# Patient Record
Sex: MALE | Race: BLACK OR AFRICAN AMERICAN | NOT HISPANIC OR LATINO | Employment: OTHER | URBAN - METROPOLITAN AREA
[De-identification: names, ages, dates, MRNs, and addresses within clinical notes are randomized per-mention and may not be internally consistent; named-entity substitution may affect disease eponyms.]

---

## 2019-06-13 ENCOUNTER — HOSPITAL ENCOUNTER (INPATIENT)
Facility: HOSPITAL | Age: 54
LOS: 2 days | Discharge: HOME | DRG: 189 | End: 2019-06-15
Attending: EMERGENCY MEDICINE | Admitting: EMERGENCY MEDICINE
Payer: MEDICARE

## 2019-06-13 DIAGNOSIS — R50.9 FEBRILE ILLNESS, ACUTE: ICD-10-CM

## 2019-06-13 DIAGNOSIS — I50.9 CHF (CONGESTIVE HEART FAILURE): ICD-10-CM

## 2019-06-13 DIAGNOSIS — I50.9 HEART FAILURE: ICD-10-CM

## 2019-06-13 DIAGNOSIS — R65.10 SIRS (SYSTEMIC INFLAMMATORY RESPONSE SYNDROME): ICD-10-CM

## 2019-06-13 DIAGNOSIS — I50.9 CONGESTIVE HEART FAILURE, UNSPECIFIED HF CHRONICITY, UNSPECIFIED HEART FAILURE TYPE: ICD-10-CM

## 2019-06-13 DIAGNOSIS — R06.00 DYSPNEA: ICD-10-CM

## 2019-06-13 DIAGNOSIS — R06.03 ACUTE RESPIRATORY DISTRESS: Primary | ICD-10-CM

## 2019-06-13 PROBLEM — N17.9 ARF (ACUTE RENAL FAILURE): Status: ACTIVE | Noted: 2019-06-13

## 2019-06-13 PROBLEM — F14.20 COCAINE USE DISORDER, SEVERE, DEPENDENCE: Status: ACTIVE | Noted: 2019-06-13

## 2019-06-13 PROBLEM — I50.23 ACUTE ON CHRONIC SYSTOLIC HEART FAILURE: Status: ACTIVE | Noted: 2019-06-13

## 2019-06-13 PROBLEM — A41.9 SEPSIS: Status: ACTIVE | Noted: 2019-06-13

## 2019-06-13 PROBLEM — N18.30 ACUTE RENAL FAILURE SUPERIMPOSED ON STAGE 3 CHRONIC KIDNEY DISEASE: Status: ACTIVE | Noted: 2019-06-13

## 2019-06-13 PROBLEM — I42.8 NICM (NONISCHEMIC CARDIOMYOPATHY): Status: ACTIVE | Noted: 2019-06-13

## 2019-06-13 PROBLEM — J96.01 ACUTE RESPIRATORY FAILURE WITH HYPOXEMIA: Status: ACTIVE | Noted: 2019-06-13

## 2019-06-13 LAB
ALBUMIN SERPL BCP-MCNC: 4 G/DL (ref 3.5–5.2)
ALLENS TEST: ABNORMAL
ALLENS TEST: ABNORMAL
ALP SERPL-CCNC: 64 U/L (ref 55–135)
ALT SERPL W/O P-5'-P-CCNC: 19 U/L (ref 10–44)
AMORPH CRY URNS QL MICRO: ABNORMAL
AMPHET+METHAMPHET UR QL: NEGATIVE
ANION GAP SERPL CALC-SCNC: 15 MMOL/L (ref 8–16)
AORTIC ROOT ANNULUS: 2.76 CM
AORTIC VALVE CUSP SEPERATION: 2.1 CM
APTT BLDCRRT: 30.5 SEC (ref 21–32)
ASCENDING AORTA: 2.69 CM
AST SERPL-CCNC: 36 U/L (ref 10–40)
AV INDEX (PROSTH): 0.89
AV MEAN GRADIENT: 2.43 MMHG
AV PEAK GRADIENT: 4.16 MMHG
AV VALVE AREA: 3.09 CM2
AV VELOCITY RATIO: 0.94
BACTERIA #/AREA URNS HPF: ABNORMAL /HPF
BARBITURATES UR QL SCN>200 NG/ML: NEGATIVE
BASOPHILS # BLD AUTO: 0.03 K/UL (ref 0–0.2)
BASOPHILS NFR BLD: 0.4 % (ref 0–1.9)
BENZODIAZ UR QL SCN>200 NG/ML: NEGATIVE
BILIRUB SERPL-MCNC: 1.3 MG/DL (ref 0.1–1)
BILIRUB UR QL STRIP: ABNORMAL
BNP SERPL-MCNC: >4900 PG/ML (ref 0–99)
BSA FOR ECHO PROCEDURE: 2.11 M2
BUN SERPL-MCNC: 34 MG/DL (ref 6–20)
BZE UR QL SCN: NORMAL
CALCIUM SERPL-MCNC: 9.9 MG/DL (ref 8.7–10.5)
CANNABINOIDS UR QL SCN: NORMAL
CHLORIDE SERPL-SCNC: 96 MMOL/L (ref 95–110)
CLARITY UR: ABNORMAL
CO2 SERPL-SCNC: 25 MMOL/L (ref 23–29)
COLOR UR: ABNORMAL
CREAT SERPL-MCNC: 2.5 MG/DL (ref 0.5–1.4)
CREAT UR-MCNC: 269.8 MG/DL (ref 23–375)
CV ECHO LV RWT: 0.37 CM
D DIMER PPP IA.FEU-MCNC: 2.12 MG/L FEU
DELSYS: ABNORMAL
DELSYS: ABNORMAL
DIFFERENTIAL METHOD: ABNORMAL
DOP CALC AO PEAK VEL: 1.02 M/S
DOP CALC AO VTI: 16.16 CM
DOP CALC LVOT AREA: 3.46 CM2
DOP CALC LVOT DIAMETER: 2.1 CM
DOP CALC LVOT PEAK VEL: 0.95 M/S
DOP CALC LVOT STROKE VOLUME: 49.89 CM3
DOP CALCLVOT PEAK VEL VTI: 14.41 CM
E WAVE DECELERATION TIME: 145.06 MSEC
E/A RATIO: 1.68
ECHO LV POSTERIOR WALL: 1.12 CM (ref 0.6–1.1)
EOSINOPHIL # BLD AUTO: 0 K/UL (ref 0–0.5)
EOSINOPHIL NFR BLD: 0 % (ref 0–8)
EP: 8
ERYTHROCYTE [DISTWIDTH] IN BLOOD BY AUTOMATED COUNT: 15.6 % (ref 11.5–14.5)
ERYTHROCYTE [SEDIMENTATION RATE] IN BLOOD BY WESTERGREN METHOD: 16 MM/H
EST. GFR  (AFRICAN AMERICAN): 33 ML/MIN/1.73 M^2
EST. GFR  (NON AFRICAN AMERICAN): 28 ML/MIN/1.73 M^2
FIO2: 100
FRACTIONAL SHORTENING: 6 % (ref 28–44)
GLUCOSE SERPL-MCNC: 143 MG/DL (ref 70–110)
GLUCOSE UR QL STRIP: NEGATIVE
HCO3 UR-SCNC: 27.6 MMOL/L (ref 24–28)
HCO3 UR-SCNC: 28.8 MMOL/L (ref 24–28)
HCT VFR BLD AUTO: 46.7 % (ref 40–54)
HGB BLD-MCNC: 15.3 G/DL (ref 14–18)
HGB UR QL STRIP: ABNORMAL
HYALINE CASTS #/AREA URNS LPF: 10 /LPF
INR PPP: 1.2 (ref 0.8–1.2)
INTERVENTRICULAR SEPTUM: 1.19 CM (ref 0.6–1.1)
IP: 12
KETONES UR QL STRIP: ABNORMAL
LA MAJOR: 5.99 CM
LA MINOR: 7.44 CM
LA WIDTH: 3.62 CM
LACTATE SERPL-SCNC: 1.8 MMOL/L (ref 0.5–2.2)
LEFT ATRIUM SIZE: 4.37 CM
LEFT ATRIUM VOLUME INDEX: 42.8 ML/M2
LEFT ATRIUM VOLUME: 89.24 CM3
LEFT INTERNAL DIMENSION IN SYSTOLE: 5.64 CM (ref 2.1–4)
LEFT VENTRICLE DIASTOLIC VOLUME INDEX: 86.19 ML/M2
LEFT VENTRICLE DIASTOLIC VOLUME: 179.52 ML
LEFT VENTRICLE MASS INDEX: 142.8 G/M2
LEFT VENTRICLE SYSTOLIC VOLUME INDEX: 74.9 ML/M2
LEFT VENTRICLE SYSTOLIC VOLUME: 156.11 ML
LEFT VENTRICULAR INTERNAL DIMENSION IN DIASTOLE: 5.99 CM (ref 3.5–6)
LEFT VENTRICULAR MASS: 297.51 G
LEUKOCYTE ESTERASE UR QL STRIP: NEGATIVE
LYMPHOCYTES # BLD AUTO: 0.5 K/UL (ref 1–4.8)
LYMPHOCYTES NFR BLD: 6.9 % (ref 18–48)
MAGNESIUM SERPL-MCNC: 1.9 MG/DL (ref 1.6–2.6)
MCH RBC QN AUTO: 28.5 PG (ref 27–31)
MCHC RBC AUTO-ENTMCNC: 32.8 G/DL (ref 32–36)
MCV RBC AUTO: 87 FL (ref 82–98)
METHADONE UR QL SCN>300 NG/ML: NEGATIVE
MICROSCOPIC COMMENT: ABNORMAL
MIN VOL: 17
MODE: ABNORMAL
MODE: ABNORMAL
MONOCYTES # BLD AUTO: 1 K/UL (ref 0.3–1)
MONOCYTES NFR BLD: 12.6 % (ref 4–15)
MV PEAK A VEL: 0.56 M/S
MV PEAK E VEL: 0.94 M/S
NEUTROPHILS # BLD AUTO: 6 K/UL (ref 1.8–7.7)
NEUTROPHILS NFR BLD: 80.1 % (ref 38–73)
NITRITE UR QL STRIP: NEGATIVE
OPIATES UR QL SCN: NEGATIVE
PCO2 BLDA: 45 MMHG (ref 35–45)
PCO2 BLDA: 45.3 MMHG (ref 35–45)
PCP UR QL SCN>25 NG/ML: NEGATIVE
PH SMN: 7.39 [PH] (ref 7.35–7.45)
PH SMN: 7.42 [PH] (ref 7.35–7.45)
PH UR STRIP: 6 [PH] (ref 5–8)
PHOSPHATE SERPL-MCNC: 4.2 MG/DL (ref 2.7–4.5)
PISA TR MAX VEL: 2.44 M/S
PLATELET # BLD AUTO: 219 K/UL (ref 150–350)
PMV BLD AUTO: 9.9 FL (ref 9.2–12.9)
PO2 BLDA: 113 MMHG (ref 80–100)
PO2 BLDA: 532 MMHG (ref 80–100)
POC BE: 2 MMOL/L
POC BE: 4 MMOL/L
POC SATURATED O2: 100 % (ref 95–100)
POC SATURATED O2: 98 % (ref 95–100)
POC TCO2: 29 MMOL/L (ref 23–27)
POC TCO2: 30 MMOL/L (ref 23–27)
POTASSIUM SERPL-SCNC: 4.6 MMOL/L (ref 3.5–5.1)
PROCALCITONIN SERPL IA-MCNC: 0.71 NG/ML
PROT SERPL-MCNC: 8 G/DL (ref 6–8.4)
PROT UR QL STRIP: ABNORMAL
PROTHROMBIN TIME: 12.7 SEC (ref 9–12.5)
PV PEAK VELOCITY: 0.82 CM/S
RA MAJOR: 5.42 CM
RA PRESSURE: 8 MMHG
RA WIDTH: 2.99 CM
RBC # BLD AUTO: 5.37 M/UL (ref 4.6–6.2)
RBC #/AREA URNS HPF: 0 /HPF (ref 0–4)
RIGHT VENTRICULAR END-DIASTOLIC DIMENSION: 4.43 CM
RV TISSUE DOPPLER FREE WALL SYSTOLIC VELOCITY 1 (APICAL 4 CHAMBER VIEW): 7.73 M/S
SAMPLE: ABNORMAL
SAMPLE: ABNORMAL
SINUS: 3.18 CM
SITE: ABNORMAL
SITE: ABNORMAL
SODIUM SERPL-SCNC: 136 MMOL/L (ref 136–145)
SP GR UR STRIP: >1.03 (ref 1–1.03)
SP02: 100
SPONT RATE: 50
STJ: 2.61 CM
TOXICOLOGY INFORMATION: NORMAL
TR MAX PG: 23.81 MMHG
TRICUSPID ANNULAR PLANE SYSTOLIC EXCURSION: 1.65 CM
TROPONIN I SERPL DL<=0.01 NG/ML-MCNC: 0.17 NG/ML (ref 0–0.03)
TV REST PULMONARY ARTERY PRESSURE: 32 MMHG
URN SPEC COLLECT METH UR: ABNORMAL
UROBILINOGEN UR STRIP-ACNC: 1 EU/DL
WBC # BLD AUTO: 7.53 K/UL (ref 3.9–12.7)
WBC #/AREA URNS HPF: 3 /HPF (ref 0–5)

## 2019-06-13 PROCEDURE — 36600 WITHDRAWAL OF ARTERIAL BLOOD: CPT

## 2019-06-13 PROCEDURE — 93010 ELECTROCARDIOGRAM REPORT: CPT | Mod: ,,, | Performed by: INTERNAL MEDICINE

## 2019-06-13 PROCEDURE — C9113 INJ PANTOPRAZOLE SODIUM, VIA: HCPCS | Performed by: EMERGENCY MEDICINE

## 2019-06-13 PROCEDURE — 81000 URINALYSIS NONAUTO W/SCOPE: CPT

## 2019-06-13 PROCEDURE — 80053 COMPREHEN METABOLIC PANEL: CPT

## 2019-06-13 PROCEDURE — 99291 PR CRITICAL CARE, E/M 30-74 MINUTES: ICD-10-PCS | Mod: ,,, | Performed by: INTERNAL MEDICINE

## 2019-06-13 PROCEDURE — 20000000 HC ICU ROOM

## 2019-06-13 PROCEDURE — 85025 COMPLETE CBC W/AUTO DIFF WBC: CPT

## 2019-06-13 PROCEDURE — 83880 ASSAY OF NATRIURETIC PEPTIDE: CPT

## 2019-06-13 PROCEDURE — 85379 FIBRIN DEGRADATION QUANT: CPT

## 2019-06-13 PROCEDURE — 87070 CULTURE OTHR SPECIMN AEROBIC: CPT

## 2019-06-13 PROCEDURE — 99291 CRITICAL CARE FIRST HOUR: CPT | Mod: ,,, | Performed by: INTERNAL MEDICINE

## 2019-06-13 PROCEDURE — 99900035 HC TECH TIME PER 15 MIN (STAT)

## 2019-06-13 PROCEDURE — 82803 BLOOD GASES ANY COMBINATION: CPT

## 2019-06-13 PROCEDURE — 80307 DRUG TEST PRSMV CHEM ANLYZR: CPT

## 2019-06-13 PROCEDURE — 25000003 PHARM REV CODE 250: Performed by: EMERGENCY MEDICINE

## 2019-06-13 PROCEDURE — 85730 THROMBOPLASTIN TIME PARTIAL: CPT

## 2019-06-13 PROCEDURE — 96368 THER/DIAG CONCURRENT INF: CPT

## 2019-06-13 PROCEDURE — 63600175 PHARM REV CODE 636 W HCPCS: Performed by: EMERGENCY MEDICINE

## 2019-06-13 PROCEDURE — 96375 TX/PRO/DX INJ NEW DRUG ADDON: CPT

## 2019-06-13 PROCEDURE — 27000190 HC CPAP FULL FACE MASK W/VALVE

## 2019-06-13 PROCEDURE — 99291 CRITICAL CARE FIRST HOUR: CPT | Mod: 25

## 2019-06-13 PROCEDURE — 27000221 HC OXYGEN, UP TO 24 HOURS

## 2019-06-13 PROCEDURE — 96365 THER/PROPH/DIAG IV INF INIT: CPT | Mod: 59

## 2019-06-13 PROCEDURE — 83735 ASSAY OF MAGNESIUM: CPT

## 2019-06-13 PROCEDURE — 84145 PROCALCITONIN (PCT): CPT

## 2019-06-13 PROCEDURE — 94660 CPAP INITIATION&MGMT: CPT

## 2019-06-13 PROCEDURE — 87205 SMEAR GRAM STAIN: CPT

## 2019-06-13 PROCEDURE — 93010 EKG 12-LEAD: ICD-10-PCS | Mod: ,,, | Performed by: INTERNAL MEDICINE

## 2019-06-13 PROCEDURE — 84484 ASSAY OF TROPONIN QUANT: CPT

## 2019-06-13 PROCEDURE — 63600175 PHARM REV CODE 636 W HCPCS: Performed by: INTERNAL MEDICINE

## 2019-06-13 PROCEDURE — 84100 ASSAY OF PHOSPHORUS: CPT

## 2019-06-13 PROCEDURE — 94761 N-INVAS EAR/PLS OXIMETRY MLT: CPT

## 2019-06-13 PROCEDURE — 85610 PROTHROMBIN TIME: CPT

## 2019-06-13 PROCEDURE — 83605 ASSAY OF LACTIC ACID: CPT

## 2019-06-13 PROCEDURE — 93005 ELECTROCARDIOGRAM TRACING: CPT

## 2019-06-13 PROCEDURE — 51702 INSERT TEMP BLADDER CATH: CPT

## 2019-06-13 PROCEDURE — 25000003 PHARM REV CODE 250: Performed by: INTERNAL MEDICINE

## 2019-06-13 PROCEDURE — 87086 URINE CULTURE/COLONY COUNT: CPT

## 2019-06-13 PROCEDURE — 87040 BLOOD CULTURE FOR BACTERIA: CPT

## 2019-06-13 RX ORDER — FUROSEMIDE 40 MG/1
TABLET ORAL 2 TIMES DAILY
Status: ON HOLD | COMMUNITY
End: 2019-08-22 | Stop reason: SDUPTHER

## 2019-06-13 RX ORDER — FUROSEMIDE 10 MG/ML
40 INJECTION INTRAMUSCULAR; INTRAVENOUS 2 TIMES DAILY
Status: DISCONTINUED | OUTPATIENT
Start: 2019-06-14 | End: 2019-06-15 | Stop reason: HOSPADM

## 2019-06-13 RX ORDER — ACETAMINOPHEN 325 MG/1
650 TABLET ORAL EVERY 8 HOURS PRN
Status: DISCONTINUED | OUTPATIENT
Start: 2019-06-13 | End: 2019-06-15 | Stop reason: HOSPADM

## 2019-06-13 RX ORDER — ASPIRIN 81 MG/1
81 TABLET ORAL DAILY
COMMUNITY

## 2019-06-13 RX ORDER — PANTOPRAZOLE SODIUM 40 MG/10ML
40 INJECTION, POWDER, LYOPHILIZED, FOR SOLUTION INTRAVENOUS DAILY
Status: DISCONTINUED | OUTPATIENT
Start: 2019-06-13 | End: 2019-06-14

## 2019-06-13 RX ORDER — OXYCODONE AND ACETAMINOPHEN 5; 325 MG/1; MG/1
1 TABLET ORAL EVERY 4 HOURS PRN
Status: DISCONTINUED | OUTPATIENT
Start: 2019-06-13 | End: 2019-06-14

## 2019-06-13 RX ORDER — FUROSEMIDE 10 MG/ML
40 INJECTION INTRAMUSCULAR; INTRAVENOUS
Status: DISCONTINUED | OUTPATIENT
Start: 2019-06-13 | End: 2019-06-13

## 2019-06-13 RX ORDER — ONDANSETRON 2 MG/ML
4 INJECTION INTRAMUSCULAR; INTRAVENOUS EVERY 8 HOURS PRN
Status: DISCONTINUED | OUTPATIENT
Start: 2019-06-13 | End: 2019-06-15 | Stop reason: HOSPADM

## 2019-06-13 RX ORDER — FUROSEMIDE 10 MG/ML
40 INJECTION INTRAMUSCULAR; INTRAVENOUS
Status: COMPLETED | OUTPATIENT
Start: 2019-06-13 | End: 2019-06-13

## 2019-06-13 RX ORDER — FUROSEMIDE 10 MG/ML
40 INJECTION INTRAMUSCULAR; INTRAVENOUS ONCE
Status: COMPLETED | OUTPATIENT
Start: 2019-06-13 | End: 2019-06-13

## 2019-06-13 RX ORDER — CARVEDILOL 3.12 MG/1
3.12 TABLET ORAL 2 TIMES DAILY WITH MEALS
Status: ON HOLD | COMMUNITY
End: 2019-06-15 | Stop reason: HOSPADM

## 2019-06-13 RX ORDER — SODIUM CHLORIDE 0.9 % (FLUSH) 0.9 %
10 SYRINGE (ML) INJECTION
Status: DISCONTINUED | OUTPATIENT
Start: 2019-06-13 | End: 2019-06-15 | Stop reason: HOSPADM

## 2019-06-13 RX ORDER — AZITHROMYCIN 500 MG/1
500 TABLET, FILM COATED ORAL DAILY
Status: ON HOLD | COMMUNITY
End: 2019-06-15 | Stop reason: HOSPADM

## 2019-06-13 RX ORDER — PRAVASTATIN SODIUM 40 MG/1
40 TABLET ORAL DAILY
COMMUNITY

## 2019-06-13 RX ORDER — HEPARIN SODIUM 5000 [USP'U]/ML
5000 INJECTION, SOLUTION INTRAVENOUS; SUBCUTANEOUS EVERY 12 HOURS
Status: DISCONTINUED | OUTPATIENT
Start: 2019-06-13 | End: 2019-06-15 | Stop reason: HOSPADM

## 2019-06-13 RX ORDER — ACETAMINOPHEN 10 MG/ML
1000 INJECTION, SOLUTION INTRAVENOUS
Status: COMPLETED | OUTPATIENT
Start: 2019-06-13 | End: 2019-06-13

## 2019-06-13 RX ADMIN — ACETAMINOPHEN 1000 MG: 10 INJECTION, SOLUTION INTRAVENOUS at 09:06

## 2019-06-13 RX ADMIN — PIPERACILLIN AND TAZOBACTAM 4.5 G: 4; .5 INJECTION, POWDER, LYOPHILIZED, FOR SOLUTION INTRAVENOUS; PARENTERAL at 09:06

## 2019-06-13 RX ADMIN — OXYCODONE HYDROCHLORIDE AND ACETAMINOPHEN 1 TABLET: 5; 325 TABLET ORAL at 10:06

## 2019-06-13 RX ADMIN — PANTOPRAZOLE SODIUM 40 MG: 40 INJECTION, POWDER, FOR SOLUTION INTRAVENOUS at 12:06

## 2019-06-13 RX ADMIN — FUROSEMIDE 40 MG: 10 INJECTION, SOLUTION INTRAVENOUS at 04:06

## 2019-06-13 RX ADMIN — FUROSEMIDE 40 MG: 10 INJECTION, SOLUTION INTRAMUSCULAR; INTRAVENOUS at 09:06

## 2019-06-13 RX ADMIN — SODIUM CHLORIDE 250 ML: 0.9 INJECTION, SOLUTION INTRAVENOUS at 10:06

## 2019-06-13 RX ADMIN — PIPERACILLIN AND TAZOBACTAM 4.5 G: 4; .5 INJECTION, POWDER, LYOPHILIZED, FOR SOLUTION INTRAVENOUS; PARENTERAL at 04:06

## 2019-06-13 RX ADMIN — VANCOMYCIN HYDROCHLORIDE 1250 MG: 1.25 INJECTION, POWDER, LYOPHILIZED, FOR SOLUTION INTRAVENOUS at 09:06

## 2019-06-13 RX ADMIN — HEPARIN SODIUM 5000 UNITS: 5000 INJECTION, SOLUTION INTRAVENOUS; SUBCUTANEOUS at 09:06

## 2019-06-13 NOTE — HPI
"53 y.o male who has CHF, CVA, HTN, Pulmonary HTN, HLD, CKD stage 3, Cardiomyopathy, and Substance abuse presents to the ED for an evaluation of acute onset, constant, severe shortness of breath that began prior to arrival.  Per EMS, patient was recently discharged form [facility] after being diagnosed with a right lung pneumonia.  EMS reports upon their arrival, the patient had a O2 sat 86% RA, a RR 60, and temp of 101.6F.  EMS reports administering 2 sl nitro and 1 inch nitro paste.  EMS reports placing the patient on a duoneb tx while en route to the ED.  Upon arrival to the ED, the patient states "the antibiotics are not working".  No other history could be obtained at this time.  History limited secondary to condition of patient.    History is limited by the patient's current condition.  He is on BiPAP, able answer questions yes or no, but otherwise appears to have decreased mental status.  He denies any angina, tells me his shortness of breath is improved since admission to the hospital.  Review of his records notes a recent hospitalization at [facility].  He has a severe nonischemic cardiomyopathy with heart catheterization noting normal coronaries in January this year.  Echocardiogram performed last month confirms persistent reduced ejection fraction.  He is currently wearing a LifeVest with plans eventually to place a defibrillator.  Note is made of significant fever on admission today, with recent diagnosis of pneumonia elsewhere.  "

## 2019-06-13 NOTE — ASSESSMENT & PLAN NOTE
Recent hospitalization with PNA and admitted with resp distress and fever  Mgmt per IM  Suggest pressor support with levophed prn and judicious IVF as he appears to have vol overload on CXR and elev BNP on background of severe NICM, EF 20%.

## 2019-06-13 NOTE — PROGRESS NOTES
Dr. Ward called with new orders received. Pt and family instructed on new orders and verb understanding.

## 2019-06-13 NOTE — HPI
"Mr Guevara is a 53 year old male with heart failure and cocaine use disorder who presented with progressive shortness of breath of one day in evolution. Patient stated he was just discharged from [facility] yesterday with abx for a "lung infection". Stated he started the antibiotic (unsure which one) but felt worse and therefore presenting to us. Patient strongly denies recent alcohol, cigarette or drug use. Denied chest pain. Further history limited due to dyspnea while speaking and drowsiness.     Upon arrival to the ED, patient was in severe respiratory distress and hypoxemia of 86% on room air. Had a life vest in place. Was also febrile to 103F. Placed on BiPAP and diuresed with one dose of furosemide IV. Patient felt better afterwards but remained on BiPAP. Cr of 2.5 without acid base disturbance. No leukocytosis, anemia or coagulopathy. Trop 0.1. BNP > 4,900, tox screen positive for cocaine and THC. Urine concentrated. CXR with possibly mild pulm edema but no clear consolidations. Records from [facility] waiting to be uploaded via care everywhere. Patient admitted to ICU for further management while on BiPAP.   "

## 2019-06-13 NOTE — PLAN OF CARE
Problem: Adult Inpatient Plan of Care  Goal: Plan of Care Review  Outcome: Ongoing (interventions implemented as appropriate)  Pt on BiPap 15/8 and 30% O2. Pt has been with increased RR all day 30's-40's.Pt remains NPO Pt has remained safe and free of injury today. Pt arrived with Lifevest, daughter instructed to take Lifevest home. Pt has remained afebrile today.

## 2019-06-13 NOTE — CONSULTS
"[facility]  Cardiology  Consult Note    Patient Name: Terry Guevara  MRN: [id number]  Admission Date: 6/13/2019  Hospital Length of Stay: 0 days  Code Status: No Order   Attending Provider: Josefina Hall MD;Am*   Consulting Provider: Adam Hubbard MD  Primary Care Physician: To Obtain Unable  Principal Problem:Sepsis    Patient information was obtained from patient and ER records.     Inpatient consult to Cardiology  Consult performed by: Adam Hubbard MD  Consult ordered by: Josefina Hall MD  Reason for consult: NICM/Sepsis/trop        Subjective:     Chief Complaint:  SPB/fever     HPI:   53 y.o male who has CHF, CVA, HTN, Pulmonary HTN, HLD, CKD stage 3, Cardiomyopathy, and Substance abuse presents to the ED for an evaluation of acute onset, constant, severe shortness of breath that began prior to arrival.  Per EMS, patient was recently discharged form [facility] after being diagnosed with a right lung pneumonia.  EMS reports upon their arrival, the patient had a O2 sat 86% RA, a RR 60, and temp of 101.6F.  EMS reports administering 2 sl nitro and 1 inch nitro paste.  EMS reports placing the patient on a duoneb tx while en route to the ED.  Upon arrival to the ED, the patient states "the antibiotics are not working".  No other history could be obtained at this time.  History limited secondary to condition of patient.    History is limited by the patient's current condition.  He is on BiPAP, able answer questions yes or no, but otherwise appears to have decreased mental status.  He denies any angina, tells me his shortness of breath is improved since admission to the hospital.  Review of his records notes a recent hospitalization at [facility].  He has a severe nonischemic cardiomyopathy with heart catheterization noting normal coronaries in January this year.  Echocardiogram performed last month confirms persistent reduced ejection fraction.  He is currently wearing a LifeVest with plans " eventually to place a defibrillator.  Note is made of significant fever on admission today, with recent diagnosis of pneumonia elsewhere.    Past Medical History:   Diagnosis Date    Cardiomyopathy     CHF (congestive heart failure)     CKD (chronic kidney disease), stage III     Cocaine abuse     Hyperlipidemia     Hypertension     Pneumonia     Proteinuria     Pulmonary hypertension     Renal disorder     Respiratory failure with hypoxia     Stroke        History reviewed. No pertinent surgical history.    Review of patient's allergies indicates:  No Known Allergies    No current facility-administered medications on file prior to encounter.      Current Outpatient Medications on File Prior to Encounter   Medication Sig    aspirin (ECOTRIN) 81 MG EC tablet Take 81 mg by mouth once daily.    azithromycin (ZITHROMAX) 500 MG tablet Take 500 mg by mouth once daily. X 3 days - first dose 6/12/11    carvedilol (COREG) 3.125 MG tablet Take 3.125 mg by mouth 2 (two) times daily with meals.    dextromethorphan 15 mg/5 mL syrup Take 10 mLs by mouth 4 (four) times daily as needed for Cough. As needed for cough for up to 10 days    furosemide (LASIX) 40 MG tablet Take by mouth 2 (two) times daily. 120 mg in AM and 80 mg in evening    pravastatin (PRAVACHOL) 40 MG tablet Take 40 mg by mouth once daily.    sacubitril-valsartan (ENTRESTO) 24-26 mg per tablet Take 1 tablet by mouth 2 (two) times daily.     Family History     None        Tobacco Use    Smoking status: Never Smoker    Smokeless tobacco: Never Used   Substance and Sexual Activity    Alcohol use: Yes    Drug use: Yes     Types: Cocaine    Sexual activity: Not on file     Review of Systems   Unable to perform ROS: acuity of condition     Objective:     Vital Signs (Most Recent):  Temp: (!) 102.7 °F (39.3 °C) (06/13/19 1004)  Pulse: 68 (06/13/19 1052)  Resp: (!) 37 (06/13/19 1052)  BP: (!) 84/55 (06/13/19 1052)  SpO2: 98 % (06/13/19 1052) Vital  Signs (24h Range):  Temp:  [102.7 °F (39.3 °C)-103.3 °F (39.6 °C)] 102.7 °F (39.3 °C)  Pulse:  [] 68  Resp:  [37-64] 37  SpO2:  [97 %-100 %] 98 %  BP: ()/() 84/55     Weight: 90.3 kg (199 lb)  Body mass index is 28.55 kg/m².    SpO2: 98 %  O2 Device (Oxygen Therapy): BiPAP      Intake/Output Summary (Last 24 hours) at 6/13/2019 1100  Last data filed at 6/13/2019 1038  Gross per 24 hour   Intake 450 ml   Output --   Net 450 ml       Lines/Drains/Airways     Drain                 Urethral Catheter 06/13/19 0915 Straight-tip 16 Fr. less than 1 day          Peripheral Intravenous Line                 Peripheral IV - Single Lumen 06/13/19 0905 20 G Right Antecubital less than 1 day         Peripheral IV - Single Lumen 06/13/19 18 G Left Forearm less than 1 day                Physical Exam   Constitutional: He appears well-developed and well-nourished. He appears distressed.   HENT:   Head: Normocephalic and atraumatic.   Eyes: Pupils are equal, round, and reactive to light. No scleral icterus.   Neck: No tracheal deviation present. No thyromegaly present.   Cardiovascular: Normal rate, regular rhythm, S1 normal and S2 normal. Exam reveals distant heart sounds. Exam reveals no gallop and no friction rub.   No murmur heard.  Pulmonary/Chest: He is in respiratory distress. He has rales.   Abdominal: Soft. He exhibits no distension.   Musculoskeletal: Normal range of motion. He exhibits no edema.   Neurological: No cranial nerve deficit.   Skin: Skin is warm and dry. He is not diaphoretic.   Psychiatric:   UTO       Current Medications:   vancomycin (VANCOCIN) IVPB  15 mg/kg Intravenous ED 1 Time           Laboratory (all labs reviewed):  CBC:  Recent Labs   Lab 06/13/19  0910   WHITE BLOOD CELL COUNT 7.53   HEMOGLOBIN 15.3   HEMATOCRIT 46.7   PLATELETS 219       CHEMISTRIES:  Recent Labs   Lab 06/13/19  0910   GLUCOSE 143 H   SODIUM 136   POTASSIUM 4.6   BUN BLD 34 H   CREATININE 2.5 H   EGFR IF   AMERICAN 33 A   EGFR IF NON- 28 A   CALCIUM 9.9   MAGNESIUM 1.9       CARDIAC BIOMARKERS:  Recent Labs   Lab 06/13/19  0910   TROPONIN I 0.172 H       COAGS:  Recent Labs   Lab 06/13/19  0910   INR 1.2       LIPIDS/LFTS:  Recent Labs   Lab 06/13/19  0910   AST 36   ALT 19       BNP:  Recent Labs   Lab 06/13/19  0910   BNP >4,900 H       TSH:        Free T4:        Diagnostic Results:  ECG (personally reviewed and interpreted tracing(s)):  6/13/19 0905 , PVC, PRWP/LAD    Chest X-Ray (personally reviewed and interpreted image(s)): 6/13/19 pulm edema, lifevest in place    Echo: 5/13/19 (Care everywhere)  CONCLUSIONS  -----------  1. SEVERE LV SYSTOLIC DYSFUNCTION  2. MODERATE PULMONARY HYPERTENSION.  3. FINDINGS ARE ESSENTIALLY AS BEFORE on 04/25/19  4. Elevated left atrial and central venous pressures.  AORTA:The segments of the aortic root, ascending aorta, aortic arch and proximal descending aorta as visualized on this study are normal.  No significant disease of the proximal abdominal aorta.  CHAMBER SIZE:Four chamber enlargement.  LEFT VENTRICLE (LV)::Overall left ventricular systolic function is very severely decreased with an EF < 20%.     The left ventricular relative wall thickness is normal (0.311) with LV mass index of 195.15g/m² consistent with eccentric hypertrophy.  LV HEMODYNAMICS:The left ventricular hemodynamics for this study based upon an LVOT diameter of 2.1cm are as follows:  Heart rate 66BPM.  Stroke volume index 20.50ml/m².  Cardiac index 1.35l/minm².  LV STRAIN:Abnormal mean left ventricular global longitudinal strain of 0% .  DIASTOLOGY:Grade III left ventricular diastolic dysfunction with significantly elevated left atrial pressure.  RIGHT VENTRICLE:The right ventricular systolic function is moderately impaired.  LEFT ATRIUM:The left atrium is markedly dilated by volume.  RIGHT ATRIUM:The right atrial pressure is elevated  (>10 mm Hg).  AORTIC VALVE:The aortic valve is  trileaflet and structurally normal.  No aortic stenosis or regurgitation.  MITRAL VALVE:Structurally normal mitral valve without stenosis.     Mild mitral regurgitation (MR).  TRICUSPID VALVE:The tricuspid valve is structurally normal without stenosis.     Mild tricuspid regurgitation.  PULMONIC VALVE:Normal pulmonic valve.  PERICARDIUM / EFFUSIONS:No pericardial effusion.  VENA CAVAE:The inferior vena cava is dilated 26.94mm with poor inspiratory collapse consistent with elevated right atrial pressures.  PA PRESSURE:The estimated resting systolic pulmonary artery pressure is moderately increased at  60-69  mm Hg  (RA pressure = 15 mm Hg).    Cath: 1/29/19 (Care Everywhere)  · No evidence of angiographically significant coronary artery disease.  Left Main   The vessel was visualized by angiography, is moderate in size and is angiographically normal. Non obstructive coronary artery disease.   Left Anterior Descending   The vessel was visualized by angiography, is moderate in size and is angiographically normal. With large diagonal branch coming of the LAD. Non obstructive coronary artery disease.   Left Circumflex   The vessel was visualized by angiography, is small and is angiographically normal. Non obstructive coronary artery disease.   Right Coronary Artery   The vessel was visualized by angiography, is moderate in size and is angiographically normal. Non obstructive coronary artery disease.         Assessment and Plan:     * Sepsis  Recent hospitalization with PNA and admitted with resp distress and fever  Mgmt per IM  Suggest pressor support with levophed prn and judicious IVF as he appears to have vol overload on CXR and elev BNP on background of severe NICM, EF 20%.    Acute respiratory distress  Per IM, on bipap    NICM (nonischemic cardiomyopathy)  EF 20% by recent echo 5/2019  Lifevest in place with plans for eventual ICD  Cath 1/2019 with normal cors  GDMT on hold with sepsis/hypotension  Suggest levophed  prn for BP suppport  Elev trop likely demand +/- ARF/CKD, doubt ACS    Acute renal failure superimposed on stage 3 chronic kidney disease  Baseline creat 1.6-1.8, now 2.5.        VTE Risk Mitigation (From admission, onward)    None        Critical care time 35min    Thank you for your consult. I will follow-up with patient. Please contact us if you have any additional questions.    Adam Hubbard MD  Cardiology   [facility]

## 2019-06-13 NOTE — ED TRIAGE NOTES
Pt presents to ER via EMS in respiratory distress.  EMS called out due to SOB.  Pt arrived in ER with CPap.  Tachypenic, tachycardic, and hypertensive.  Pt reports he was recently discharged from [facility] with pneumonia.

## 2019-06-13 NOTE — ASSESSMENT & PLAN NOTE
EF 20% by recent echo 5/2019  Lifevest in place with plans for eventual ICD  Cath 1/2019 with normal cors  GDMT on hold with sepsis/hypotension  Suggest levophed prn for BP suppport  Elev trop likely demand +/- ARF/CKD, doubt ACS

## 2019-06-13 NOTE — SUBJECTIVE & OBJECTIVE
Past Medical History:   Diagnosis Date    Cardiomyopathy     CHF (congestive heart failure)     CKD (chronic kidney disease), stage III     Cocaine abuse     Hyperlipidemia     Hypertension     Pneumonia     Proteinuria     Pulmonary hypertension     Renal disorder     Respiratory failure with hypoxia     Stroke        History reviewed. No pertinent surgical history.    Review of patient's allergies indicates:  No Known Allergies    No current facility-administered medications on file prior to encounter.      Current Outpatient Medications on File Prior to Encounter   Medication Sig    aspirin (ECOTRIN) 81 MG EC tablet Take 81 mg by mouth once daily.    azithromycin (ZITHROMAX) 500 MG tablet Take 500 mg by mouth once daily. X 3 days - first dose 6/12/11    carvedilol (COREG) 3.125 MG tablet Take 3.125 mg by mouth 2 (two) times daily with meals.    dextromethorphan 15 mg/5 mL syrup Take 10 mLs by mouth 4 (four) times daily as needed for Cough. As needed for cough for up to 10 days    furosemide (LASIX) 40 MG tablet Take by mouth 2 (two) times daily. 120 mg in AM and 80 mg in evening    pravastatin (PRAVACHOL) 40 MG tablet Take 40 mg by mouth once daily.    sacubitril-valsartan (ENTRESTO) 24-26 mg per tablet Take 1 tablet by mouth 2 (two) times daily.     Family History     None        Tobacco Use    Smoking status: Never Smoker    Smokeless tobacco: Never Used   Substance and Sexual Activity    Alcohol use: Yes    Drug use: Yes     Types: Cocaine    Sexual activity: Not on file     Review of Systems   Unable to perform ROS: acuity of condition     Objective:     Vital Signs (Most Recent):  Temp: (!) 102.7 °F (39.3 °C) (06/13/19 1004)  Pulse: 68 (06/13/19 1052)  Resp: (!) 37 (06/13/19 1052)  BP: (!) 84/55 (06/13/19 1052)  SpO2: 98 % (06/13/19 1052) Vital Signs (24h Range):  Temp:  [102.7 °F (39.3 °C)-103.3 °F (39.6 °C)] 102.7 °F (39.3 °C)  Pulse:  [] 68  Resp:  [37-64] 37  SpO2:  [97  %-100 %] 98 %  BP: ()/() 84/55     Weight: 90.3 kg (199 lb)  Body mass index is 28.55 kg/m².    SpO2: 98 %  O2 Device (Oxygen Therapy): BiPAP      Intake/Output Summary (Last 24 hours) at 6/13/2019 1100  Last data filed at 6/13/2019 1038  Gross per 24 hour   Intake 450 ml   Output --   Net 450 ml       Lines/Drains/Airways     Drain                 Urethral Catheter 06/13/19 0915 Straight-tip 16 Fr. less than 1 day          Peripheral Intravenous Line                 Peripheral IV - Single Lumen 06/13/19 0905 20 G Right Antecubital less than 1 day         Peripheral IV - Single Lumen 06/13/19 18 G Left Forearm less than 1 day                Physical Exam   Constitutional: He appears well-developed and well-nourished. He appears distressed.   HENT:   Head: Normocephalic and atraumatic.   Eyes: Pupils are equal, round, and reactive to light. No scleral icterus.   Neck: No tracheal deviation present. No thyromegaly present.   Cardiovascular: Normal rate, regular rhythm, S1 normal and S2 normal. Exam reveals distant heart sounds. Exam reveals no gallop and no friction rub.   No murmur heard.  Pulmonary/Chest: He is in respiratory distress. He has rales.   Abdominal: Soft. He exhibits no distension.   Musculoskeletal: Normal range of motion. He exhibits no edema.   Neurological: No cranial nerve deficit.   Skin: Skin is warm and dry. He is not diaphoretic.   Psychiatric:   UTO       Current Medications:   vancomycin (VANCOCIN) IVPB  15 mg/kg Intravenous ED 1 Time           Laboratory (all labs reviewed):  CBC:  Recent Labs   Lab 06/13/19  0910   WHITE BLOOD CELL COUNT 7.53   HEMOGLOBIN 15.3   HEMATOCRIT 46.7   PLATELETS 219       CHEMISTRIES:  Recent Labs   Lab 06/13/19  0910   GLUCOSE 143 H   SODIUM 136   POTASSIUM 4.6   BUN BLD 34 H   CREATININE 2.5 H   EGFR IF  33 A   EGFR IF NON- 28 A   CALCIUM 9.9   MAGNESIUM 1.9       CARDIAC BIOMARKERS:  Recent Labs   Lab 06/13/19  0910    TROPONIN I 0.172 H       COAGS:  Recent Labs   Lab 06/13/19  0910   INR 1.2       LIPIDS/LFTS:  Recent Labs   Lab 06/13/19  0910   AST 36   ALT 19       BNP:  Recent Labs   Lab 06/13/19  0910   BNP >4,900 H       TSH:        Free T4:        Diagnostic Results:  ECG (personally reviewed and interpreted tracing(s)):  6/13/19 0905 , PVC, PRWP/LAD    Chest X-Ray (personally reviewed and interpreted image(s)): 6/13/19 pulm edema, lifevest in place    Echo: 5/13/19 (Care everywhere)  CONCLUSIONS  -----------  1. SEVERE LV SYSTOLIC DYSFUNCTION  2. MODERATE PULMONARY HYPERTENSION.  3. FINDINGS ARE ESSENTIALLY AS BEFORE on 04/25/19  4. Elevated left atrial and central venous pressures.  AORTA:The segments of the aortic root, ascending aorta, aortic arch and proximal descending aorta as visualized on this study are normal.  No significant disease of the proximal abdominal aorta.  CHAMBER SIZE:Four chamber enlargement.  LEFT VENTRICLE (LV)::Overall left ventricular systolic function is very severely decreased with an EF < 20%.     The left ventricular relative wall thickness is normal (0.311) with LV mass index of 195.15g/m² consistent with eccentric hypertrophy.  LV HEMODYNAMICS:The left ventricular hemodynamics for this study based upon an LVOT diameter of 2.1cm are as follows:  Heart rate 66BPM.  Stroke volume index 20.50ml/m².  Cardiac index 1.35l/minm².  LV STRAIN:Abnormal mean left ventricular global longitudinal strain of 0% .  DIASTOLOGY:Grade III left ventricular diastolic dysfunction with significantly elevated left atrial pressure.  RIGHT VENTRICLE:The right ventricular systolic function is moderately impaired.  LEFT ATRIUM:The left atrium is markedly dilated by volume.  RIGHT ATRIUM:The right atrial pressure is elevated  (>10 mm Hg).  AORTIC VALVE:The aortic valve is trileaflet and structurally normal.  No aortic stenosis or regurgitation.  MITRAL VALVE:Structurally normal mitral valve without stenosis.      Mild mitral regurgitation (MR).  TRICUSPID VALVE:The tricuspid valve is structurally normal without stenosis.     Mild tricuspid regurgitation.  PULMONIC VALVE:Normal pulmonic valve.  PERICARDIUM / EFFUSIONS:No pericardial effusion.  VENA CAVAE:The inferior vena cava is dilated 26.94mm with poor inspiratory collapse consistent with elevated right atrial pressures.  PA PRESSURE:The estimated resting systolic pulmonary artery pressure is moderately increased at  60-69  mm Hg  (RA pressure = 15 mm Hg).    Cath: 1/29/19 (Care Everywhere)  · No evidence of angiographically significant coronary artery disease.  Left Main   The vessel was visualized by angiography, is moderate in size and is angiographically normal. Non obstructive coronary artery disease.   Left Anterior Descending   The vessel was visualized by angiography, is moderate in size and is angiographically normal. With large diagonal branch coming of the LAD. Non obstructive coronary artery disease.   Left Circumflex   The vessel was visualized by angiography, is small and is angiographically normal. Non obstructive coronary artery disease.   Right Coronary Artery   The vessel was visualized by angiography, is moderate in size and is angiographically normal. Non obstructive coronary artery disease.

## 2019-06-13 NOTE — ASSESSMENT & PLAN NOTE
Likely secondary to continued cocaine use  Patient has a LifeVest on. Tentative plan for AICD placement next month, per notes  Patient underwent left heart catheterization in February of this year.  Showed clean coronary arteries  Echo confirmed severe global hypokinesis.

## 2019-06-13 NOTE — ED PROVIDER NOTES
"Encounter Date: 6/13/2019    SCRIBE #1 NOTE: Angie LANDERS am scribing for, and in the presence of, Josefina Hall MD.       History     Chief Complaint   Patient presents with    Respiratory Distress     SOB, tachypenic, and tachycardic.  Recently discharged with pneumonia.  Arrived in ER with cpap.     CC: Respiratory Distress  Patient arrived via EMS    HPI: This 53 y.o male who has CHF, CVA, HTN, Pulmonary HTN, HLD, CKD stage 3, Cardiomyopathy, and Substance abuse presents to the ED for an evaluation of acute onset, constant, severe shortness of breath that began prior to arrival.  Per EMS, patient was recently discharged form [facility] after being diagnosed with a right lung pneumonia.  EMS reports upon their arrival, the patient had a O2 sat 86% RA, a RR 60, and temp of 101.6F.  EMS reports administering 2 sl nitro and 1 inch nitro paste.  EMS reports placing the patient on a duoneb tx while en route to the ED.  Upon arrival to the ED, the patient states "the antibiotics are not working".  No other history could be obtained at this time.  History limited secondary to condition of patient.    Of note, the patient is currently prescribed Lasix 120 mg in the AM and 40 mg in the PM.  25 mg spirolactone daily. 20 mg Isosorbide TID, 3.125 mg Coreg BID. 25 mg Hydralazine TID.     The history is provided by the patient and the EMS personnel. No  was used.     Review of patient's allergies indicates:  No Known Allergies  Past Medical History:   Diagnosis Date    Cardiomyopathy     CHF (congestive heart failure)     CKD (chronic kidney disease), stage III     Cocaine abuse     Hyperlipidemia     Hypertension     Pneumonia     Proteinuria     Pulmonary hypertension     Renal disorder     Respiratory failure with hypoxia     Stroke      History reviewed. No pertinent surgical history.  History reviewed. No pertinent family history.  Social History     Tobacco Use    Smoking status: Never " Smoker    Smokeless tobacco: Never Used   Substance Use Topics    Alcohol use: Yes    Drug use: Yes     Types: Cocaine     Review of Systems   Unable to perform ROS: Severe respiratory distress   Respiratory: Positive for shortness of breath.        Physical Exam     Initial Vitals   BP Pulse Resp Temp SpO2   06/13/19 0905 06/13/19 0900 06/13/19 0900 06/13/19 0927 06/13/19 0900   (!) 192/129 101 (!) 57 (!) 103.3 °F (39.6 °C) 100 %      MAP       --                Physical Exam    Nursing note and vitals reviewed.  Constitutional: He is not diaphoretic. No distress.   Patient arrived on CPAP. Awake, lucid, anxious, moderate distress, tachypneic   HENT:   Head: Normocephalic and atraumatic.   Mouth/Throat: Oropharynx is clear and moist.   Eyes: Conjunctivae and EOM are normal. Pupils are equal, round, and reactive to light. No scleral icterus.   Neck: Normal range of motion. Neck supple. No JVD present.   Cardiovascular: Normal rate, regular rhythm and intact distal pulses.   Pulmonary/Chest: No stridor. He is in respiratory distress.   sittign up. Able to speak full sentences through cpap, but with moderate tachypnea, taking short breaths. Mild decreased air movement in bases. Good chest rise.    Abdominal: Soft. Bowel sounds are normal. He exhibits no distension. There is no tenderness.   Musculoskeletal: Normal range of motion. He exhibits no edema or tenderness.   Neurological: He is alert and oriented to person, place, and time. He has normal strength. No cranial nerve deficit.   Skin: Skin is warm and dry. No rash noted.   Psychiatric: He has a normal mood and affect.         ED Course   Critical Care  Date/Time: 6/13/2019 10:46 AM  Performed by: Josefina Hall MD  Authorized by: Josefina Hall MD   Direct patient critical care time: 15 minutes  Ordering / reviewing critical care time: 15 minutes  Documentation critical care time: 10 minutes  Consulting other physicians critical care time: 10  minutes  Total critical care time (exclusive of procedural time) : 50 minutes  Critical care was necessary to treat or prevent imminent or life-threatening deterioration of the following conditions: sepsis, cardiac failure, shock, circulatory failure and respiratory failure.  Critical care was time spent personally by me on the following activities: discussions with consultants, evaluation of patient's response to treatment, examination of patient, ordering and performing treatments and interventions, ordering and review of laboratory studies, ordering and review of radiographic studies, pulse oximetry and re-evaluation of patient's condition.        Labs Reviewed   CBC W/ AUTO DIFFERENTIAL - Abnormal; Notable for the following components:       Result Value    RDW 15.6 (*)     Lymph # 0.5 (*)     Gran% 80.1 (*)     Lymph% 6.9 (*)     All other components within normal limits   COMPREHENSIVE METABOLIC PANEL - Abnormal; Notable for the following components:    Glucose 143 (*)     BUN, Bld 34 (*)     Creatinine 2.5 (*)     Total Bilirubin 1.3 (*)     eGFR if  33 (*)     eGFR if non  28 (*)     All other components within normal limits   URINALYSIS, REFLEX TO URINE CULTURE - Abnormal; Notable for the following components:    Appearance, UA Hazy (*)     Specific Gravity, UA >1.030 (*)     Protein, UA 3+ (*)     Ketones, UA Trace (*)     Bilirubin (UA) 1+ (*)     Occult Blood UA 2+ (*)     All other components within normal limits    Narrative:     Preferred Collection Type->Urine, Clean Catch   PROTIME-INR - Abnormal; Notable for the following components:    Prothrombin Time 12.7 (*)     All other components within normal limits   B-TYPE NATRIURETIC PEPTIDE - Abnormal; Notable for the following components:    BNP >4,900 (*)     All other components within normal limits   TROPONIN I - Abnormal; Notable for the following components:    Troponin I 0.172 (*)     All other components within  normal limits   URINALYSIS MICROSCOPIC - Abnormal; Notable for the following components:    Bacteria Few (*)     Hyaline Casts, UA 10 (*)     Amorphous, UA Many (*)     All other components within normal limits    Narrative:     Preferred Collection Type->Urine, Clean Catch   PROCALCITONIN - Abnormal; Notable for the following components:    Procalcitonin 0.71 (*)     All other components within normal limits   D DIMER, QUANTITATIVE - Abnormal; Notable for the following components:    D-Dimer 2.12 (*)     All other components within normal limits   ISTAT PROCEDURE - Abnormal; Notable for the following components:    POC PO2 532 (*)     POC HCO3 28.8 (*)     POC TCO2 30 (*)     All other components within normal limits   CULTURE, BLOOD   CULTURE, BLOOD   CULTURE, URINE   LACTIC ACID, PLASMA   MAGNESIUM   PHOSPHORUS   APTT   PROCALCITONIN   PROCALCITONIN   DRUG SCREEN PANEL, URINE EMERGENCY     EKG Readings: (Independently Interpreted)   Initial Reading: No STEMI. Rhythm: Sinus Tachycardia. Heart Rate: 112 bpm. Ectopy: Rare. Clinical Impression: Sinus Tachycardia Other Impression: Biatrial enlargement     ECG Results          EKG 12-lead (In process)  Result time 06/13/19 11:06:40    In process by Interface, Lab In [facility] (06/13/19 11:06:40)                 Narrative:    Test Reason : R06.00,    Vent. Rate : 112 BPM     Atrial Rate : 112 BPM     P-R Int : 182 ms          QRS Dur : 106 ms      QT Int : 340 ms       P-R-T Axes : 077 -57 092 degrees     QTc Int : 464 ms    Sinus tachycardia with occasional Premature ventricular complexes  Biatrial enlargement  Left axis deviation  Pulmonary disease pattern  T wave abnormality, consider lateral ischemia  Abnormal ECG  No previous ECGs available    Referred By: [email or web address]   SELF           Confirmed By:                   In process by Interface, Lab In [facility] (06/13/19 11:04:33)                 Narrative:    Test Reason : R06.00,    Vent. Rate : 112 BPM     Atrial Rate :  112 BPM     P-R Int : 182 ms          QRS Dur : 106 ms      QT Int : 340 ms       P-R-T Axes : 077 -57 092 degrees     QTc Int : 464 ms    Sinus tachycardia with occasional Premature ventricular complexes  Biatrial enlargement  Left axis deviation  Pulmonary disease pattern  T wave abnormality, consider lateral ischemia  Abnormal ECG  No previous ECGs available    Referred By: [email or web address]   SELF           Confirmed By:                             Imaging Results          X-Ray Chest AP Portable (Final result)  Result time 06/13/19 09:32:14    Final result by Ghanshyam Elizabeth MD (06/13/19 09:32:14)                 Impression:      Findings suggestive of mild pulmonary edema.      Electronically signed by: Ghanshyam Elizabeth MD  Date:    06/13/2019  Time:    09:32             Narrative:    EXAMINATION:  XR CHEST AP PORTABLE    CLINICAL HISTORY:  Sepsis;    TECHNIQUE:  Single frontal view of the chest was performed.    COMPARISON:  None    FINDINGS:  Multiple devices overlie the patient.    Moderate cardiomegaly.  Prominence of the pulmonary vasculature with mildly prominent interstitial lung markings which may be seen with mild pulmonary edema.  No focal consolidation.  No pleural effusion.  No pneumothorax.  Degenerative changes of the spine.                                 Medical Decision Making:   History:   Old Medical Records: I decided to obtain old medical records.  Old Records Summarized: records from previous admission(s).  Differential Diagnosis:       Clinical Tests:   Lab Tests: Ordered and Reviewed  Radiological Study: Ordered and Reviewed  Medical Tests: Ordered and Reviewed  Sepsis Perfusion Assessment: I attest, a sepsis perfusion exam was performed within 6 hours of Septic Shock presentation, following fluid resuscitation.  ED Management:  54 yo with history of CHF on current meds that include 120 Lasix a.m., 40 Lasix p.m., spironolactone, Coreg, isosorbide, all of which he takes with good compliance,  and was recently put on Z-Thuan for diagnosis of pneumonia 2 days ago.  Was just discharged from [facility] yesterday.  States he was told he has pneumonia.  began feeling more short of breath yesterday that worsened today.  Placed on BiPAP by EMS, room air sats in the low 80s upon their arrival.  Patient also has a life vest with external defibrillator and reportedly has plans for placement of formal defibrillator and pacemaker with [facility] later this month.    Unknown EF with history of CHF.  Patient acute respiratory distress with tachypnea, tachycardia and temp 103.3°.  SIRS positive dust being treated for sepsis, however I do not see an overt pneumonia on current chest x-ray, white count is normal and lactate is normal. He does have decreased urine output, as he historically states he has no problems making urine and has not been able to make urine today.  Sheikh was placed and he does have some bright yellow urine, mild amount.  He is being treated as sepsis and CHF exacerbation, but I think it is reasonable to give him gentle fluids.  Again EF is unknown.  This is clinically a mixed picture of CHF exacerbation and sepsis.  He was given nitro EN route.  He did get 40 of Lasix upon arrival to the ED.  He has gotten some fluid volume with 250 cc of normal saline, 100 cc of fluid with IV Tylenol.  Troponin is elevated.  BNP is pending.  Blood cultures have been sent.  As pt was more calm, bp began to drop, nitropaste wiped off chest. He is resting states he's tired b/c he was so anxious and scared earlier. Is lucid. Dr. Franco called for admission, came to bedside quickly.  Saw pt. Pt family arrived. Will consult cards, obtain echo, outpt records not on chart at this time, unable to see, ?compter issue. Will treat w abx, possible cardiogenic sepsis.             Scribe Attestation:   Scribe #1: I performed the above scribed service and the documentation accurately describes the services I performed. I attest to the accuracy of  the note.    Attending Attestation:           Physician Attestation for Scribe:  Physician Attestation Statement for Scribe #1: I, Josefina Hall MD, reviewed documentation, as scribed by Angie Robles in my presence, and it is both accurate and complete.                    Clinical Impression:       ICD-10-CM ICD-9-CM   1. Acute respiratory distress R06.03 518.82   2. Dyspnea R06.00 786.09   3. Congestive heart failure, unspecified HF chronicity, unspecified heart failure type I50.9 428.0   4. Febrile illness, acute R50.9 780.60   5. SIRS (systemic inflammatory response syndrome) R65.10 995.90   6. CHF (congestive heart failure) I50.9 428.0                                Josefina Hall MD  06/14/19 0566

## 2019-06-13 NOTE — PROGRESS NOTES
Patient placed on BIPAP per Dr. Hall verbal order.  12/8, 100%.  Patient sats are 100%.    BIPAP is continuous at this time. Will monitor patient's status.

## 2019-06-13 NOTE — ASSESSMENT & PLAN NOTE
Likely due to respiratory distress. Resolved once no longer in distress  Abx discontinued. F/u cultures. Vitals q 4 hours

## 2019-06-13 NOTE — ASSESSMENT & PLAN NOTE
Baseline creat 1.6-1.8, now 2.5.   Encounter addended by: Enrico Anderson DO on: 5/9/2019 3:27 PM   Actions taken: Sign clinical note, Charge Capture section accepted

## 2019-06-13 NOTE — ASSESSMENT & PLAN NOTE
Per records from [facility], patient has underlying nonischemic cardiomyopathy with an estimated EF of less than 20%  Has documented poor dietary compliance and issues with cocaine abuse  I suspect acute exacerbation is due to both of these  Improving with IV furosemide. Will continue as so.   Hold BB and ACE-I while on IV lasix as BP will not tolerate currently  Plan to transition diuretics to oral in AM. Will restart BP meds then.

## 2019-06-13 NOTE — ASSESSMENT & PLAN NOTE
Patient's baseline creatinine is around 1.5  Presented with Cr of 2.5. Improving with IV diuresis suggesting cardiorenal etiology  BMP in AM. Remove woodard today

## 2019-06-13 NOTE — PROGRESS NOTES
Message and call to Dr. Ward about con't increased RR 30's-40's. Dr. Franco notified of increased RR with new orders received.Pt instructed on new orders and verb understanding.

## 2019-06-13 NOTE — ASSESSMENT & PLAN NOTE
At this point, heart failure exacerbation is the most likely cause of patient's respiratory failure.  Infection is unlikely therefore will start empiric antibiotics.    Acute PE also unlikely but definitely at risk. Continue prophylactic heparin while inpatient.  Weaned off BiPAP this a.m. and now stable on low-flow nasal cannula.  Will continue with IV diuresis as an attempt to wean off supplemental oxygen.  Start cardiac diet today.  Spent more than 5 min educating on negative impact of cocaine in this patient's health.  Patient verbalized understanding and is motivated to quit.  Other life vest to be used upon discharge. Follow up renal function in a.m..

## 2019-06-13 NOTE — PROGRESS NOTES
[facility] Hospital Medicine  Progress Note    Patient Name: Terry Guevara  MRN: [id number]  Patient Class: IP- Inpatient   Admission Date: 6/13/2019  Length of Stay: 0 days  Attending Physician: Barbara Boyer MD  Primary Care Provider: To Obtain Unable        Subjective:     Principal Problem:Acute respiratory failure with hypoxemia    Overview/Hospital Course:No notes on file    Past Medical History:   Diagnosis Date    Cardiomyopathy     CHF (congestive heart failure)     CKD (chronic kidney disease), stage III     Cocaine abuse     Hyperlipidemia     Hypertension     Pneumonia     Proteinuria     Pulmonary hypertension     Renal disorder     Respiratory failure with hypoxia     Stroke        History reviewed. No pertinent surgical history.    Review of patient's allergies indicates:  No Known Allergies    No current facility-administered medications on file prior to encounter.      Current Outpatient Medications on File Prior to Encounter   Medication Sig    aspirin (ECOTRIN) 81 MG EC tablet Take 81 mg by mouth once daily.    azithromycin (ZITHROMAX) 500 MG tablet Take 500 mg by mouth once daily. X 3 days - first dose 6/12/11    carvedilol (COREG) 3.125 MG tablet Take 3.125 mg by mouth 2 (two) times daily with meals.    dextromethorphan 15 mg/5 mL syrup Take 10 mLs by mouth 4 (four) times daily as needed for Cough. As needed for cough for up to 10 days    furosemide (LASIX) 40 MG tablet Take by mouth 2 (two) times daily. 120 mg in AM and 80 mg in evening    pravastatin (PRAVACHOL) 40 MG tablet Take 40 mg by mouth once daily.    sacubitril-valsartan (ENTRESTO) 24-26 mg per tablet Take 1 tablet by mouth 2 (two) times daily.     Family History     None        Tobacco Use    Smoking status: Never Smoker    Smokeless tobacco: Never Used   Substance and Sexual Activity    Alcohol use: Yes    Drug use: Yes     Types: Cocaine    Sexual activity: Not on file     Review of  Systems   Reason unable to perform ROS: HPI limited due to symptoms/clinical status.   Constitutional:        Unable to answer   HENT:        Unable to answer   Eyes: Negative.    Respiratory: Positive for shortness of breath.    Cardiovascular: Negative for chest pain.   Gastrointestinal:        Unable to answer   Endocrine:        Unable to answer   Genitourinary:        Unable to answer   Musculoskeletal:        Unable to answer   Skin:        Unable to answer   Neurological:        Unable to answer   Hematological:        Unable to answer   Psychiatric/Behavioral:        Unable to answer     Objective:     Vital Signs (Most Recent):  Temp: 97.7 °F (36.5 °C) (06/13/19 1120)  Pulse: 62 (06/13/19 1330)  Resp: (!) 31 (06/13/19 1330)  BP: 105/71 (06/13/19 1330)  SpO2: 98 % (06/13/19 1330) Vital Signs (24h Range):  Temp:  [97.7 °F (36.5 °C)-103.3 °F (39.6 °C)] 97.7 °F (36.5 °C)  Pulse:  [] 62  Resp:  [31-64] 31  SpO2:  [97 %-100 %] 98 %  BP: ()/() 105/71     Weight: 90.3 kg (199 lb)  Body mass index is 28.55 kg/m².    Physical Exam   Constitutional: He appears well-developed. He appears distressed.   Using accessory muscles while on BiPAP   HENT:   Head: Normocephalic and atraumatic.   BiPAP in Place   Eyes: Conjunctivae and EOM are normal.   Neck: Normal range of motion. JVD present.   Pulmonary/Chest: No stridor. He has no wheezes. He has rales. He exhibits no tenderness.   Using accessory muscles   Has life vest on   Abdominal: Soft. Bowel sounds are normal.   Musculoskeletal: Normal range of motion. He exhibits no edema.   Neurological:   drowsy   Skin: Skin is warm and dry. Capillary refill takes less than 2 seconds. He is not diaphoretic.   Psychiatric:   Unable to assess   Nursing note and vitals reviewed.        CRANIAL NERVES     CN III, IV, VI   Extraocular motions are normal.        Significant Labs: All pertinent labs within the past 24 hours have been reviewed.    Significant Imaging: I  have reviewed all pertinent imaging results/findings within the past 24 hours.  I have reviewed and interpreted all pertinent imaging results/findings within the past 24 hours.        Assessment/Plan:      * Acute respiratory failure with hypoxemia  I suspect community-acquired pneumonia versus heart failure exacerbation versus both  A right lung consolidation was seen yesterday on a CT scan obtained at [facility]. Also with fever, elevated procalcitonin and respiratory distress which raises concerns for pneumonia. Has no leukocytosis. Agree with broad-spectrum antibiotics.  Blood and sputum cultures are pending.    Patient does have severely depressed left ventricular systolic function in setting of continuous cocaine use.  BNP is also very elevated.  I could not appreciate rales on exam nor significant edema x-ray however this is not a main patient does not have pulmonary edema. Therefore I agree with continued IV diuresis.    D-dimer elevated.  Will obtain lower extremity ultrasounds to rule out DVT.  If negative, will obtain a V/Q scan but will consider a CTA of chest as kidney function is better.  Heparin for DVT prophylaxis    Continue BiPAP. Wean as tolerated.  Patient is full code    Pulmonary and cardiology on board for co-anagement      Acute on chronic systolic heart failure  Per records from [facility], patient has underlying nonischemic cardiomyopathy with an estimated EF of less than 20%  Has documented poor dietary compliance and issues with cocaine abuse  I suspect acute exacerbation is due to both of these  There is also suspected community-acquired pneumonia of the right upper lung per a CT obtained at [facility] yesterday  Will continue with IV diuresis as tolerated and start broad-spectrum antibiotics   Blood cultures are pending will order sputum cultures as well  Further management of respiratory failure as above      Cocaine use disorder, severe, dependence  This is a continuous problem but is negatively impacting  patient's health  Will educate on cessation when appropriate      Fever  Unknown etiology  Could be infectious versus reactive from his respiratory distress  On empiric antibiotics. Workup for sepsis in progress  Vitals every 15 min      Acute renal failure superimposed on stage 3 chronic kidney disease  Patient's baseline creatinine is around 1.5  Creatinine today is 2.5.  About 115 cc so far after a dose of Lasix and Sheikh placed in the ED  Will continue with strict Is&Os, BMP daily and IV diuresis  Will consider Nephrology consultation      NICM (nonischemic cardiomyopathy)  Likely secondary to continued cocaine use  Patient has a LifeVest on. Tentative plan for AICD placement next month, per notes  Patient underwent left heart catheterization in February of this year.  Showed clean coronary arteries  Checking troponins. No need for statin or aspirin at this time  Cardiac monitoring. Repeat Echo pending.         VTE Risk Mitigation (From admission, onward)        Ordered     heparin (porcine) injection 5,000 Units  Every 12 hours      06/13/19 1357     IP VTE HIGH RISK PATIENT  Once      06/13/19 1127     Place MACIE hose  Until discontinued      06/13/19 1127          Critical care time spent on the evaluation and treatment of severe organ dysfunction, review of pertinent labs and imaging studies, discussions with consulting providers and discussions with patient/family: > 35 minutes.      Barbara Franco MD  Department of Hospital Medicine   [facility]

## 2019-06-13 NOTE — NURSING
Pt arrived to unit at 1115. Pt placed on cardiac monitor and POC discussed with pt. Pt encouraged to voice questions and concerns.Pt verb understanding. Daughter at bedside.

## 2019-06-13 NOTE — PROGRESS NOTES
Transferred patient to .  Patient placed back on BIPAP 12/8, 40%. sats are 100%.  Gave therapist report

## 2019-06-13 NOTE — SUBJECTIVE & OBJECTIVE
Past Medical History:   Diagnosis Date    Cardiomyopathy     CHF (congestive heart failure)     CKD (chronic kidney disease), stage III     Cocaine abuse     Hyperlipidemia     Hypertension     Pneumonia     Proteinuria     Pulmonary hypertension     Renal disorder     Respiratory failure with hypoxia     Stroke        History reviewed. No pertinent surgical history.    Review of patient's allergies indicates:  No Known Allergies    No current facility-administered medications on file prior to encounter.      Current Outpatient Medications on File Prior to Encounter   Medication Sig    aspirin (ECOTRIN) 81 MG EC tablet Take 81 mg by mouth once daily.    azithromycin (ZITHROMAX) 500 MG tablet Take 500 mg by mouth once daily. X 3 days - first dose 6/12/11    carvedilol (COREG) 3.125 MG tablet Take 3.125 mg by mouth 2 (two) times daily with meals.    dextromethorphan 15 mg/5 mL syrup Take 10 mLs by mouth 4 (four) times daily as needed for Cough. As needed for cough for up to 10 days    furosemide (LASIX) 40 MG tablet Take by mouth 2 (two) times daily. 120 mg in AM and 80 mg in evening    pravastatin (PRAVACHOL) 40 MG tablet Take 40 mg by mouth once daily.    sacubitril-valsartan (ENTRESTO) 24-26 mg per tablet Take 1 tablet by mouth 2 (two) times daily.     Family History     None        Tobacco Use    Smoking status: Never Smoker    Smokeless tobacco: Never Used   Substance and Sexual Activity    Alcohol use: Yes    Drug use: Yes     Types: Cocaine    Sexual activity: Not on file     Review of Systems   Reason unable to perform ROS: HPI limited due to symptoms/clinical status.   Constitutional:        Unable to answer   HENT:        Unable to answer   Eyes: Negative.    Respiratory: Positive for shortness of breath.    Cardiovascular: Negative for chest pain.   Gastrointestinal:        Unable to answer   Endocrine:        Unable to answer   Genitourinary:        Unable to answer    Musculoskeletal:        Unable to answer   Skin:        Unable to answer   Neurological:        Unable to answer   Hematological:        Unable to answer   Psychiatric/Behavioral:        Unable to answer     Objective:     Vital Signs (Most Recent):  Temp: 97.7 °F (36.5 °C) (06/13/19 1120)  Pulse: 62 (06/13/19 1330)  Resp: (!) 31 (06/13/19 1330)  BP: 105/71 (06/13/19 1330)  SpO2: 98 % (06/13/19 1330) Vital Signs (24h Range):  Temp:  [97.7 °F (36.5 °C)-103.3 °F (39.6 °C)] 97.7 °F (36.5 °C)  Pulse:  [] 62  Resp:  [31-64] 31  SpO2:  [97 %-100 %] 98 %  BP: ()/() 105/71     Weight: 90.3 kg (199 lb)  Body mass index is 28.55 kg/m².    Physical Exam   Constitutional: He appears well-developed. He appears distressed.   Using accessory muscles while on BiPAP   HENT:   Head: Normocephalic and atraumatic.   BiPAP in Place   Eyes: Conjunctivae and EOM are normal.   Neck: Normal range of motion. JVD present.   Pulmonary/Chest: No stridor. He has no wheezes. He has rales. He exhibits no tenderness.   Using accessory muscles   Has life vest on   Abdominal: Soft. Bowel sounds are normal.   Musculoskeletal: Normal range of motion. He exhibits no edema.   Neurological:   drowsy   Skin: Skin is warm and dry. Capillary refill takes less than 2 seconds. He is not diaphoretic.   Psychiatric:   Unable to assess   Nursing note and vitals reviewed.        CRANIAL NERVES     CN III, IV, VI   Extraocular motions are normal.        Significant Labs: All pertinent labs within the past 24 hours have been reviewed.    Significant Imaging: I have reviewed all pertinent imaging results/findings within the past 24 hours.  I have reviewed and interpreted all pertinent imaging results/findings within the past 24 hours.

## 2019-06-14 PROBLEM — I50.9 ACUTE HEART FAILURE: Status: ACTIVE | Noted: 2019-06-14

## 2019-06-14 LAB
ALBUMIN SERPL BCP-MCNC: 3.1 G/DL (ref 3.5–5.2)
ALP SERPL-CCNC: 50 U/L (ref 55–135)
ALT SERPL W/O P-5'-P-CCNC: 21 U/L (ref 10–44)
ANION GAP SERPL CALC-SCNC: 12 MMOL/L (ref 8–16)
AST SERPL-CCNC: 29 U/L (ref 10–40)
BASOPHILS # BLD AUTO: 0.03 K/UL (ref 0–0.2)
BASOPHILS NFR BLD: 0.4 % (ref 0–1.9)
BILIRUB SERPL-MCNC: 1.1 MG/DL (ref 0.1–1)
BUN SERPL-MCNC: 32 MG/DL (ref 6–20)
CALCIUM SERPL-MCNC: 9.1 MG/DL (ref 8.7–10.5)
CHLORIDE SERPL-SCNC: 98 MMOL/L (ref 95–110)
CO2 SERPL-SCNC: 31 MMOL/L (ref 23–29)
CREAT SERPL-MCNC: 2 MG/DL (ref 0.5–1.4)
DIFFERENTIAL METHOD: ABNORMAL
EOSINOPHIL # BLD AUTO: 0 K/UL (ref 0–0.5)
EOSINOPHIL NFR BLD: 0.1 % (ref 0–8)
ERYTHROCYTE [DISTWIDTH] IN BLOOD BY AUTOMATED COUNT: 15.5 % (ref 11.5–14.5)
EST. GFR  (AFRICAN AMERICAN): 43 ML/MIN/1.73 M^2
EST. GFR  (NON AFRICAN AMERICAN): 37 ML/MIN/1.73 M^2
GLUCOSE SERPL-MCNC: 77 MG/DL (ref 70–110)
HCT VFR BLD AUTO: 42.9 % (ref 40–54)
HGB BLD-MCNC: 13.8 G/DL (ref 14–18)
LYMPHOCYTES # BLD AUTO: 1.1 K/UL (ref 1–4.8)
LYMPHOCYTES NFR BLD: 16.7 % (ref 18–48)
MCH RBC QN AUTO: 28.2 PG (ref 27–31)
MCHC RBC AUTO-ENTMCNC: 32.2 G/DL (ref 32–36)
MCV RBC AUTO: 88 FL (ref 82–98)
MONOCYTES # BLD AUTO: 0.9 K/UL (ref 0.3–1)
MONOCYTES NFR BLD: 13.9 % (ref 4–15)
NEUTROPHILS # BLD AUTO: 4.6 K/UL (ref 1.8–7.7)
NEUTROPHILS NFR BLD: 68.9 % (ref 38–73)
PLATELET # BLD AUTO: 175 K/UL (ref 150–350)
PMV BLD AUTO: 10.3 FL (ref 9.2–12.9)
POTASSIUM SERPL-SCNC: 4.4 MMOL/L (ref 3.5–5.1)
PROT SERPL-MCNC: 6.4 G/DL (ref 6–8.4)
RBC # BLD AUTO: 4.9 M/UL (ref 4.6–6.2)
SODIUM SERPL-SCNC: 141 MMOL/L (ref 136–145)
WBC # BLD AUTO: 6.69 K/UL (ref 3.9–12.7)

## 2019-06-14 PROCEDURE — 25000003 PHARM REV CODE 250: Performed by: EMERGENCY MEDICINE

## 2019-06-14 PROCEDURE — 63600175 PHARM REV CODE 636 W HCPCS: Performed by: INTERNAL MEDICINE

## 2019-06-14 PROCEDURE — A4216 STERILE WATER/SALINE, 10 ML: HCPCS | Performed by: INTERNAL MEDICINE

## 2019-06-14 PROCEDURE — 25000003 PHARM REV CODE 250: Performed by: INTERNAL MEDICINE

## 2019-06-14 PROCEDURE — 99233 SBSQ HOSP IP/OBS HIGH 50: CPT | Mod: ,,, | Performed by: INTERNAL MEDICINE

## 2019-06-14 PROCEDURE — 99232 PR SUBSEQUENT HOSPITAL CARE,LEVL II: ICD-10-PCS | Mod: GT,,, | Performed by: INTERNAL MEDICINE

## 2019-06-14 PROCEDURE — 99233 PR SUBSEQUENT HOSPITAL CARE,LEVL III: ICD-10-PCS | Mod: ,,, | Performed by: INTERNAL MEDICINE

## 2019-06-14 PROCEDURE — C9113 INJ PANTOPRAZOLE SODIUM, VIA: HCPCS | Performed by: EMERGENCY MEDICINE

## 2019-06-14 PROCEDURE — 85025 COMPLETE CBC W/AUTO DIFF WBC: CPT

## 2019-06-14 PROCEDURE — 63600175 PHARM REV CODE 636 W HCPCS: Performed by: EMERGENCY MEDICINE

## 2019-06-14 PROCEDURE — 80053 COMPREHEN METABOLIC PANEL: CPT

## 2019-06-14 PROCEDURE — 21400001 HC TELEMETRY ROOM

## 2019-06-14 PROCEDURE — 27000221 HC OXYGEN, UP TO 24 HOURS

## 2019-06-14 PROCEDURE — 99900035 HC TECH TIME PER 15 MIN (STAT)

## 2019-06-14 PROCEDURE — 36415 COLL VENOUS BLD VENIPUNCTURE: CPT

## 2019-06-14 PROCEDURE — 99232 SBSQ HOSP IP/OBS MODERATE 35: CPT | Mod: GT,,, | Performed by: INTERNAL MEDICINE

## 2019-06-14 PROCEDURE — 94761 N-INVAS EAR/PLS OXIMETRY MLT: CPT

## 2019-06-14 PROCEDURE — 94660 CPAP INITIATION&MGMT: CPT

## 2019-06-14 RX ORDER — GUAIFENESIN 600 MG/1
600 TABLET, EXTENDED RELEASE ORAL 2 TIMES DAILY
Status: DISCONTINUED | OUTPATIENT
Start: 2019-06-14 | End: 2019-06-15 | Stop reason: HOSPADM

## 2019-06-14 RX ORDER — PANTOPRAZOLE SODIUM 40 MG/1
40 TABLET, DELAYED RELEASE ORAL DAILY
Status: DISCONTINUED | OUTPATIENT
Start: 2019-06-15 | End: 2019-06-15 | Stop reason: HOSPADM

## 2019-06-14 RX ADMIN — PIPERACILLIN AND TAZOBACTAM 4.5 G: 4; .5 INJECTION, POWDER, LYOPHILIZED, FOR SOLUTION INTRAVENOUS; PARENTERAL at 12:06

## 2019-06-14 RX ADMIN — Medication 10 ML: at 08:06

## 2019-06-14 RX ADMIN — HEPARIN SODIUM 5000 UNITS: 5000 INJECTION, SOLUTION INTRAVENOUS; SUBCUTANEOUS at 09:06

## 2019-06-14 RX ADMIN — GUAIFENESIN 600 MG: 600 TABLET, EXTENDED RELEASE ORAL at 08:06

## 2019-06-14 RX ADMIN — FUROSEMIDE 40 MG: 10 INJECTION, SOLUTION INTRAVENOUS at 06:06

## 2019-06-14 RX ADMIN — HEPARIN SODIUM 5000 UNITS: 5000 INJECTION, SOLUTION INTRAVENOUS; SUBCUTANEOUS at 08:06

## 2019-06-14 RX ADMIN — PANTOPRAZOLE SODIUM 40 MG: 40 INJECTION, POWDER, FOR SOLUTION INTRAVENOUS at 09:06

## 2019-06-14 RX ADMIN — FUROSEMIDE 40 MG: 10 INJECTION, SOLUTION INTRAVENOUS at 09:06

## 2019-06-14 NOTE — ASSESSMENT & PLAN NOTE
Patient with minimal hypoxia. Increased work of breathing. ABG well compensated.   Bipap PRN. Wean O2 as tolerated.   Given somnolence patient may have aspiration. Procalcitonin elevated--although patient does have renal insufficiency.   -Cont. ABx.

## 2019-06-14 NOTE — NURSING
Patient arrived to unit via bed with transport and ICU nurse. Telemetry monitor in place. 3L O2 in place per N/C. Patient in no apparent distress. Will continue to monitor.

## 2019-06-14 NOTE — SUBJECTIVE & OBJECTIVE
Interval History: weaned off BiPAP and doing well on NC. No longer febrile since admission. No leukocytosis. No infectious source identified. Patient admits to recent cocaine use.     Review of Systems   Constitutional: Negative.    Respiratory: Negative.    Cardiovascular: Negative.    Gastrointestinal: Negative.      Objective:     Vital Signs (Most Recent):  Temp: 99.3 °F (37.4 °C) (06/14/19 1115)  Pulse: 78 (06/14/19 1115)  Resp: 20 (06/14/19 1115)  BP: 128/78 (06/14/19 1115)  SpO2: 96 % (06/14/19 1115) Vital Signs (24h Range):  Temp:  [96.6 °F (35.9 °C)-99.4 °F (37.4 °C)] 99.3 °F (37.4 °C)  Pulse:  [57-87] 78  Resp:  [10-57] 20  SpO2:  [96 %-100 %] 96 %  BP: ()/() 128/78     Weight: 75.5 kg (166 lb 7.2 oz)  Body mass index is 23.88 kg/m².    Intake/Output Summary (Last 24 hours) at 6/14/2019 1141  Last data filed at 6/14/2019 1107  Gross per 24 hour   Intake 920 ml   Output 2775 ml   Net -1855 ml      Physical Exam   Constitutional: He is oriented to person, place, and time. He appears well-developed. No distress.   Cardiovascular: Normal rate and regular rhythm.   Pulmonary/Chest: Effort normal and breath sounds normal.   Breathing comfortably on low flow NC   Abdominal: Soft. Bowel sounds are normal.   Musculoskeletal: He exhibits no edema.   Neurological: He is alert and oriented to person, place, and time.   Skin: He is not diaphoretic.   Psychiatric: He has a normal mood and affect. His behavior is normal. Judgment and thought content normal.   Nursing note and vitals reviewed.      Significant Labs: All pertinent labs within the past 24 hours have been reviewed.    Significant Imaging: I have reviewed all pertinent imaging results/findings within the past 24 hours.  I have reviewed and interpreted all pertinent imaging results/findings within the past 24 hours.

## 2019-06-14 NOTE — CARE UPDATE
[facility]  ICU Multidisciplinary Bedside Rounds     UPDATE     Date: 6/14/2019      Plan of care reviewed with the following, Nurse, Charge Nurse, Physician, Resp. Therapist and Infection Prevention.       Needs/ Goals for the day: wean pt from BiPAP to NC as tolerated, order diet, possible step down to floor today      Level of Care: OK to Transfer once weaned from BiPAP

## 2019-06-14 NOTE — HOSPITAL COURSE
6-14 Comfortable on BiPAP. Cr down to 2.0. Diureses 1.2 L    Echo 6/13/19  · Severely decreased left ventricular systolic function. The estimated ejection fraction is 15%  · Severe global hypokinetic wall motion.  · Mild eccentric left ventricular hypertrophy.  · Moderate left ventricular enlargement.  · Grade I (mild) left ventricular diastolic dysfunction consistent with impaired relaxation.  · Mild right ventricular enlargement.  · Moderately to severely reduced right ventricular systolic function.  · Mild mitral regurgitation.  · Mild tricuspid regurgitation.  · The estimated PA systolic pressure is 32 mm Hg

## 2019-06-14 NOTE — HOSPITAL COURSE
Mr Guevara presented with acute respiratory failure with hypoxemia.  Records from [facility] obtained and reviewed.  Revealed underlying heart failure with the EF of less than 20% secondary to cocaine abuse. Patient was placed on BiPAP and given a dose of IV Lasix.  Patient felt more comfortable afterwards but remained tachypneic and unable to wean off BiPAP.  He was therefore admitted to ICU for close observation as he was a high risk for further decompensation.  Initially concern for sepsis secondary to community-acquired pneumonia given patient's fever, respiratory distress and elevated procalcitonin.  Also due to reports of right lung consolidation seen on CT of chest obtained the day prior at [facility].  He was initiated on broad-spectrum antibiotics and blood/sputum cultures obtained. But most importantly, patient has known severely depressed LV systolic function of less than 20%, continues use of cocaine, elevated BNP and quick improvement with IV Lasix, suggests this is most likely due to acute heart failure exacerbation secondary to continues drug use.  Patient's fever then not recur, did not have leukocytosis and no large consolidation appreciated on chest x-ray. Antibiotics therefore stopped.  Acute PE was also less likely. On 6/14 the patient was weaned off BiPAP to low-flow nasal cannula.  Remained stable.was transferred to Telemetry and remains stable,she was not on BB duo to cocaine abuse,at DC time he was stable on RA,walked with no difficulty, and remains table on RA,patient has been discharged home with CHF medications ,but no BB, and follow up with PCP and cardiology in [facility] in next few days.

## 2019-06-14 NOTE — PROGRESS NOTES
[facility]  Cardiology  Progress Note    Patient Name: Terry Guevara  MRN: [id number]  Admission Date: 6/13/2019  Hospital Length of Stay: 1 days  Code Status: Full Code   Attending Physician: Barbara Boyer MD   Primary Care Physician: To Obtain Unable  Expected Discharge Date:   Principal Problem:Acute respiratory failure with hypoxemia    Subjective:     Hospital Course:   6-14 Comfortable on BiPAP. Cr down to 2.0. Diureses 1.2 L    Echo 6/13/19  · Severely decreased left ventricular systolic function. The estimated ejection fraction is 15%  · Severe global hypokinetic wall motion.  · Mild eccentric left ventricular hypertrophy.  · Moderate left ventricular enlargement.  · Grade I (mild) left ventricular diastolic dysfunction consistent with impaired relaxation.  · Mild right ventricular enlargement.  · Moderately to severely reduced right ventricular systolic function.  · Mild mitral regurgitation.  · Mild tricuspid regurgitation.  The estimated PA systolic pressure is 32 mm Hg    Interval History:     Review of Systems   Unable to perform ROS: acuity of condition     Objective:     Vital Signs (Most Recent):  Temp: 98.3 °F (36.8 °C) (06/14/19 0338)  Pulse: 61 (06/14/19 0715)  Resp: (!) 32 (06/14/19 0715)  BP: 118/75 (06/14/19 0700)  SpO2: 98 % (06/14/19 0715) Vital Signs (24h Range):  Temp:  [96.6 °F (35.9 °C)-103.3 °F (39.6 °C)] 98.3 °F (36.8 °C)  Pulse:  [] 61  Resp:  [10-64] 32  SpO2:  [96 %-100 %] 98 %  BP: ()/() 118/75     Weight: 75.5 kg (166 lb 7.2 oz)  Body mass index is 23.88 kg/m².     SpO2: 98 %  O2 Device (Oxygen Therapy): BiPAP      Intake/Output Summary (Last 24 hours) at 6/14/2019 0750  Last data filed at 6/14/2019 0600  Gross per 24 hour   Intake 1130 ml   Output 2330 ml   Net -1200 ml       Lines/Drains/Airways     Drain                 Urethral Catheter 06/13/19 0915 Straight-tip 16 Fr. less than 1 day          Peripheral Intravenous Line                  Peripheral IV - Single Lumen 06/13/19 18 G Left Forearm 1 day         Peripheral IV - Single Lumen 06/13/19 0905 20 G Right Antecubital less than 1 day                Physical Exam   Constitutional: He is oriented to person, place, and time. He appears well-developed and well-nourished.   HENT:   Head: Normocephalic and atraumatic.   Eyes: Pupils are equal, round, and reactive to light. Conjunctivae are normal.   Neck: Normal range of motion. Neck supple.   Cardiovascular: Normal rate, normal heart sounds and intact distal pulses.   Pulmonary/Chest: Effort normal. He has rales.   Abdominal: Soft. Bowel sounds are normal.   Musculoskeletal: Normal range of motion.   Neurological: He is alert and oriented to person, place, and time.   Skin: Skin is warm and dry.       Significant Labs: All pertinent lab results from the last 24 hours have been reviewed.    Significant Imaging: Echocardiogram: 2D echo with color flow doppler: No results found for this or any previous visit.    Assessment and Plan:     Brief HPI:     Acute renal failure superimposed on stage 3 chronic kidney disease  Baseline creat 1.6-1.8, now 2.5.    Sepsis  Recent hospitalization with PNA and admitted with resp distress and fever  Mgmt per IM  Suggest pressor support with levophed prn and judicious IVF as he appears to have vol overload on CXR and elev BNP on background of severe NICM, EF 20%.    NICM (nonischemic cardiomyopathy)  EF 20% by recent echo 5/2019  Lifevest in place with plans for eventual ICD  Cath 1/2019 with normal cors  GDMT on hold with sepsis/hypotension  Suggest levophed prn for BP suppport  Elev trop likely demand +/- ARF/CKD, doubt ACS    Acute on chronic systolic heart failure  Per IM, on bipap. Diuresis and afterload reduction as tolerated        VTE Risk Mitigation (From admission, onward)        Ordered     heparin (porcine) injection 5,000 Units  Every 12 hours      06/13/19 1357     IP VTE HIGH RISK PATIENT  Once       06/13/19 1127     Place MACIE hose  Until discontinued      06/13/19 1127        Will f/u prn    Chavez Pisano MD  Cardiology  [facility] [place]

## 2019-06-14 NOTE — PROGRESS NOTES
"[facility] Medicine  Progress Note    Patient Name: Terry Guevara  MRN: [id number]  Patient Class: IP- Inpatient   Admission Date: 6/13/2019  Length of Stay: 1 days  Attending Physician: Barbara Boyer MD  Primary Care Provider: To Obtain Unable        Subjective:     Principal Problem:Acute respiratory failure with hypoxemia      HPI:  Mr Guevara is a 53 year old male with heart failure and cocaine use disorder who presented with progressive shortness of breath of one day in evolution. Patient stated he was just discharged from [facility] yesterday with abx for a "lung infection". Stated he started the antibiotic (unsure which one) but felt worse and therefore presenting to us. Patient strongly denies recent alcohol, cigarette or drug use. Denied chest pain. Further history limited due to dyspnea while speaking and drowsiness.     Upon arrival to the ED, patient was in severe respiratory distress and hypoxemia of 86% on room air. Had a life vest in place. Was also febrile to 103F. Placed on BiPAP and diuresed with one dose of furosemide IV. Patient felt better afterwards but remained on BiPAP. Cr of 2.5 without acid base disturbance. No leukocytosis, anemia or coagulopathy. Trop 0.1. BNP > 4,900, tox screen positive for cocaine and THC. Urine concentrated. CXR with possibly mild pulm edema but no clear consolidations. Records from [facility] waiting to be uploaded via care everywhere. Patient admitted to ICU for further management while on BiPAP.     Overview/Hospital Course:  Mr Guevara presented with acute respiratory failure with hypoxemia.  Records from [facility] obtained and reviewed.  Revealed underlying heart failure with the EF of less than 20% secondary to cocaine abuse. Patient was placed on BiPAP and given a dose of IV Lasix.  Patient felt more comfortable afterwards but remained tachypneic and unable to wean off BiPAP.  He was therefore admitted to ICU for close observation as he was a high risk for " further decompensation.  Initially concern for sepsis secondary to community-acquired pneumonia given patient's fever, respiratory distress and elevated procalcitonin.  Also due to reports of right lung consolidation seen on CT of chest obtained the day prior at [facility].  He was initiated on broad-spectrum antibiotics and blood/sputum cultures obtained. But most importantly, patient has known severely depressed LV systolic function of less than 20%, continues use of cocaine, elevated BNP and quick improvement with IV Lasix, suggests this is most likely due to acute heart failure exacerbation secondary to continues drug use.  Patient's fever then not recur, did not have leukocytosis and no large consolidation appreciated on chest x-ray. Antibiotics therefore stopped.  Acute PE was also less likely. On 6/14 the patient was weaned off BiPAP to low-flow nasal cannula.  Remained stable.  Step-down to floor on 6/14.    Interval History: weaned off BiPAP and doing well on NC. No longer febrile since admission. No leukocytosis. No infectious source identified. Patient admits to recent cocaine use.     Review of Systems   Constitutional: Negative.    Respiratory: Negative.    Cardiovascular: Negative.    Gastrointestinal: Negative.      Objective:     Vital Signs (Most Recent):  Temp: 99.3 °F (37.4 °C) (06/14/19 1115)  Pulse: 78 (06/14/19 1115)  Resp: 20 (06/14/19 1115)  BP: 128/78 (06/14/19 1115)  SpO2: 96 % (06/14/19 1115) Vital Signs (24h Range):  Temp:  [96.6 °F (35.9 °C)-99.4 °F (37.4 °C)] 99.3 °F (37.4 °C)  Pulse:  [57-87] 78  Resp:  [10-57] 20  SpO2:  [96 %-100 %] 96 %  BP: ()/() 128/78     Weight: 75.5 kg (166 lb 7.2 oz)  Body mass index is 23.88 kg/m².    Intake/Output Summary (Last 24 hours) at 6/14/2019 1141  Last data filed at 6/14/2019 1107  Gross per 24 hour   Intake 920 ml   Output 2775 ml   Net -1855 ml      Physical Exam   Constitutional: He is oriented to person, place, and time. He appears  well-developed. No distress.   Cardiovascular: Normal rate and regular rhythm.   Pulmonary/Chest: Effort normal and breath sounds normal.   Breathing comfortably on low flow NC   Abdominal: Soft. Bowel sounds are normal.   Musculoskeletal: He exhibits no edema.   Neurological: He is alert and oriented to person, place, and time.   Skin: He is not diaphoretic.   Psychiatric: He has a normal mood and affect. His behavior is normal. Judgment and thought content normal.   Nursing note and vitals reviewed.      Significant Labs: All pertinent labs within the past 24 hours have been reviewed.    Significant Imaging: I have reviewed all pertinent imaging results/findings within the past 24 hours.  I have reviewed and interpreted all pertinent imaging results/findings within the past 24 hours.      Assessment/Plan:      * Acute respiratory failure with hypoxemia  At this point, heart failure exacerbation is the most likely cause of patient's respiratory failure.  Infection is unlikely therefore will start empiric antibiotics.    Acute PE also unlikely but definitely at risk. Continue prophylactic heparin while inpatient.  Weaned off BiPAP this a.m. and now stable on low-flow nasal cannula.  Will continue with IV diuresis as an attempt to wean off supplemental oxygen.  Start cardiac diet today.  Spent more than 5 min educating on negative impact of cocaine in this patient's health.  Patient verbalized understanding and is motivated to quit.  Other life vest to be used upon discharge. Follow up renal function in a.m..      Acute on chronic systolic heart failure  Per records from [facility], patient has underlying nonischemic cardiomyopathy with an estimated EF of less than 20%  Has documented poor dietary compliance and issues with cocaine abuse  I suspect acute exacerbation is due to both of these  Improving with IV furosemide. Will continue as so.   Hold BB and ACE-I while on IV lasix as BP will not tolerate currently  Plan to  transition diuretics to oral in AM. Will restart BP meds then.       Cocaine use disorder, severe, dependence  This is a continuous problem but is negatively impacting patient's health        Fever  Likely due to respiratory distress. Resolved once no longer in distress  Abx discontinued. F/u cultures. Vitals q 4 hours      Acute renal failure superimposed on stage 3 chronic kidney disease  Patient's baseline creatinine is around 1.5  Presented with Cr of 2.5. Improving with IV diuresis suggesting cardiorenal etiology  BMP in AM. Remove woodard today      NICM (nonischemic cardiomyopathy)  Likely secondary to continued cocaine use  Patient has a LifeVest on. Tentative plan for AICD placement next month, per notes  Patient underwent left heart catheterization in February of this year.  Showed clean coronary arteries  Echo confirmed severe global hypokinesis.         VTE Risk Mitigation (From admission, onward)        Ordered     heparin (porcine) injection 5,000 Units  Every 12 hours      06/13/19 1357     IP VTE HIGH RISK PATIENT  Once      06/13/19 1127     Place MACIE hose  Until discontinued      06/13/19 1127      stable for step down to floor today.     Critical care time spent on the evaluation and treatment of severe organ dysfunction, review of pertinent labs and imaging studies, discussions with consulting providers and discussions with patient/family: > 35 minutes.      Barbara Franco MD  Department of Hospital Medicine   [facility]

## 2019-06-14 NOTE — CONSULTS
[facility]  Pulmonology  Consult Note    Patient Name: Terry Guevara  MRN: [id number]  Admission Date: 6/13/2019  Hospital Length of Stay: 0 days  Code Status: Full Code  Attending Physician: Barbara Boyer MD  Primary Care Provider: To Obtain Unable   Principal Problem: Acute respiratory failure with hypoxemia    Inpatient consult to Pulmonology  Consult performed by: Domenic Ward MD  Consult ordered by: Barbara Boyer MD        Subjective:     HPI:  53 year old male with CHF(EF 15%) followed at [facility], CKD 3, HTN, cocaine abuse who presents with fever and SOB. Patient has multiple recent clinic and hospital visits at [facility]. Patient was recently treated for PNA. Patient was in the ER at [facility] 1 day ago. Given IV Lasix and discharged.   Patient has lifevest in place. [facility] following for ICD placement.   Pulmonary consulted for acute hypoxic respiratory failure. Patient currently on Bipap.     Past Medical History:   Diagnosis Date    Cardiomyopathy     CHF (congestive heart failure)     CKD (chronic kidney disease), stage III     Cocaine abuse     Hyperlipidemia     Hypertension     Pneumonia     Proteinuria     Pulmonary hypertension     Renal disorder     Respiratory failure with hypoxia     Stroke        History reviewed. No pertinent surgical history.    Review of patient's allergies indicates:  No Known Allergies    Family History     None        Tobacco Use    Smoking status: Never Smoker    Smokeless tobacco: Never Used   Substance and Sexual Activity    Alcohol use: Yes    Drug use: Yes     Types: Cocaine    Sexual activity: Not on file         Review of Systems   Unable to perform ROS: Acuity of condition     Objective:     Vital Signs (Most Recent):  Temp: 98 °F (36.7 °C) (06/13/19 1900)  Pulse: 67 (06/13/19 2107)  Resp: (!) 35 (06/13/19 2107)  BP: 112/76 (06/13/19 2102)  SpO2: 98 % (06/13/19 2107) Vital Signs (24h Range):  Temp:  [96.6 °F (35.9 °C)-103.3  °F (39.6 °C)] 98 °F (36.7 °C)  Pulse:  [] 67  Resp:  [31-64] 35  SpO2:  [96 %-100 %] 98 %  BP: ()/() 112/76     Weight: 90.3 kg (199 lb)  Body mass index is 28.55 kg/m².      Intake/Output Summary (Last 24 hours) at 6/13/2019 2114  Last data filed at 6/13/2019 1900  Gross per 24 hour   Intake 670 ml   Output 980 ml   Net -310 ml       Physical Exam   Constitutional: He is oriented to person, place, and time. He appears well-developed and well-nourished. No distress.   HENT:   Head: Normocephalic and atraumatic.   Eyes: Pupils are equal, round, and reactive to light. EOM are normal.   Neck: Normal range of motion. Neck supple.   Pulmonary/Chest: He has no wheezes.   Bipap in place. Tachypnea.   Abdominal: Soft. Bowel sounds are normal.   Musculoskeletal: Normal range of motion. He exhibits no edema.   Neurological: He is alert and oriented to person, place, and time.   Skin: Skin is warm and dry. He is not diaphoretic. No erythema.   Nursing note and vitals reviewed.      Vents:  Oxygen Concentration (%): 30 (06/13/19 2052)    Lines/Drains/Airways     Drain                 Urethral Catheter 06/13/19 0915 Straight-tip 16 Fr. less than 1 day          Peripheral Intravenous Line                 Peripheral IV - Single Lumen 06/13/19 0905 20 G Right Antecubital less than 1 day         Peripheral IV - Single Lumen 06/13/19 18 G Left Forearm less than 1 day                Significant Labs:    CBC/Anemia Profile:  Recent Labs   Lab 06/13/19  0910   WBC 7.53   HGB 15.3   HCT 46.7      MCV 87   RDW 15.6*        Chemistries:  Recent Labs   Lab 06/13/19  0910      K 4.6   CL 96   CO2 25   BUN 34*   CREATININE 2.5*   CALCIUM 9.9   ALBUMIN 4.0   PROT 8.0   BILITOT 1.3*   ALKPHOS 64   ALT 19   AST 36   MG 1.9   PHOS 4.2       All pertinent labs within the past 24 hours have been reviewed.    Significant Imaging:   I have reviewed all pertinent imaging results/findings within the past 24  hours.    Assessment/Plan:     * Acute respiratory failure with hypoxemia  Patient with minimal hypoxia. Increased work of breathing. ABG well compensated.   Bipap PRN. Wean O2 as tolerated.   Given somnolence patient may have aspiration. Procalcitonin elevated--although patient does have renal insufficiency.   -Cont. ABx.     Cocaine use disorder, severe, dependence  Cocaine positive. Likely contributing to some of his symptoms.     Acute renal failure superimposed on stage 3 chronic kidney disease  Good UOP.     Acute on chronic systolic heart failure  BNP elevated. Would consider diuresis as tolerated.       Critical Care time: 55 minutes.     Critical Care time for the evaluation and treatment for severe organ dysfunction, review of pertinent labs and imaging studies discussions with consulting services and discussions with patient/family.    Family updated at bedside    Continue ICU care.         Thank you for your consult. I will follow-up with patient. Please contact us if you have any additional questions.     Domenic Ward MD  Pulmonology  [facility]

## 2019-06-14 NOTE — H&P
"[facility] Hospital Medicine  History & Physical    Patient Name: Terry Guevara  MRN: [id number]  Admission Date: 6/13/2019  Attending Physician: Barbara Boyer MD   Primary Care Provider: To Obtain Unable         Patient information was obtained from patient, past medical records and ER records.     Subjective:     Principal Problem:Acute respiratory failure with hypoxemia    Chief Complaint:   Chief Complaint   Patient presents with    Respiratory Distress     SOB, tachypenic, and tachycardic.  Recently discharged with pneumonia.  Arrived in ER with cpap.        HPI: Mr Guevara is a 53 year old male with heart failure and cocaine use disorder who presented with progressive shortness of breath of one day in evolution. Patient stated he was just discharged from [facility] yesterday with abx for a "lung infection". Stated he started the antibiotic (unsure which one) but felt worse and therefore presenting to us. Patient strongly denies recent alcohol, cigarette or drug use. Denied chest pain. Further history limited due to dyspnea while speaking and drowsiness.     Upon arrival to the ED, patient was in severe respiratory distress and hypoxemia of 86% on room air. Had a life vest in place. Was also febrile to 103F. Placed on BiPAP and diuresed with one dose of furosemide IV. Patient felt better afterwards but remained on BiPAP. Cr of 2.5 without acid base disturbance. No leukocytosis, anemia or coagulopathy. Trop 0.1. BNP > 4,900, tox screen positive for cocaine and THC. Urine concentrated. CXR with possibly mild pulm edema but no clear consolidations. Records from [facility] waiting to be uploaded via care everywhere. Patient admitted to ICU for further management while on BiPAP.     Past Medical History:   Diagnosis Date    Cardiomyopathy      CHF (congestive heart failure)      CKD (chronic kidney disease), stage III      Cocaine abuse      Hyperlipidemia      Hypertension      Pneumonia      " Proteinuria      Pulmonary hypertension      Renal disorder      Respiratory failure with hypoxia      Stroke           History reviewed. No pertinent surgical history.     Review of patient's allergies indicates:  No Known Allergies     No current facility-administered medications on file prior to encounter.            Current Outpatient Medications on File Prior to Encounter   Medication Sig    aspirin (ECOTRIN) 81 MG EC tablet Take 81 mg by mouth once daily.    azithromycin (ZITHROMAX) 500 MG tablet Take 500 mg by mouth once daily. X 3 days - first dose 6/12/11    carvedilol (COREG) 3.125 MG tablet Take 3.125 mg by mouth 2 (two) times daily with meals.    dextromethorphan 15 mg/5 mL syrup Take 10 mLs by mouth 4 (four) times daily as needed for Cough. As needed for cough for up to 10 days    furosemide (LASIX) 40 MG tablet Take by mouth 2 (two) times daily. 120 mg in AM and 80 mg in evening    pravastatin (PRAVACHOL) 40 MG tablet Take 40 mg by mouth once daily.    sacubitril-valsartan (ENTRESTO) 24-26 mg per tablet Take 1 tablet by mouth 2 (two) times daily.          Family History      None                Tobacco Use    Smoking status: Never Smoker    Smokeless tobacco: Never Used   Substance and Sexual Activity    Alcohol use: Yes    Drug use: Yes       Types: Cocaine    Sexual activity: Not on file      Review of Systems   Reason unable to perform ROS: HPI limited due to symptoms/clinical status.   Constitutional:        Unable to answer   HENT:        Unable to answer   Eyes: Negative.    Respiratory: Positive for shortness of breath.    Cardiovascular: Negative for chest pain.   Gastrointestinal:        Unable to answer   Endocrine:        Unable to answer   Genitourinary:        Unable to answer   Musculoskeletal:        Unable to answer   Skin:        Unable to answer   Neurological:        Unable to answer   Hematological:        Unable to answer   Psychiatric/Behavioral:        Unable to  answer      Objective:      Vital Signs (Most Recent):  Temp: 97.7 °F (36.5 °C) (06/13/19 1120)  Pulse: 62 (06/13/19 1330)  Resp: (!) 31 (06/13/19 1330)  BP: 105/71 (06/13/19 1330)  SpO2: 98 % (06/13/19 1330) Vital Signs (24h Range):  Temp:  [97.7 °F (36.5 °C)-103.3 °F (39.6 °C)] 97.7 °F (36.5 °C)  Pulse:  [] 62  Resp:  [31-64] 31  SpO2:  [97 %-100 %] 98 %  BP: ()/() 105/71      Weight: 90.3 kg (199 lb)  Body mass index is 28.55 kg/m².     Physical Exam   Constitutional: He appears well-developed. He appears distressed.   Using accessory muscles while on BiPAP   HENT:   Head: Normocephalic and atraumatic.   BiPAP in Place   Eyes: Conjunctivae and EOM are normal.   Neck: Normal range of motion. JVD present.   Pulmonary/Chest: No stridor. He has no wheezes. He has rales. He exhibits no tenderness.   Using accessory muscles   Has life vest on   Abdominal: Soft. Bowel sounds are normal.   Musculoskeletal: Normal range of motion. He exhibits no edema.   Neurological:   drowsy   Skin: Skin is warm and dry. Capillary refill takes less than 2 seconds. He is not diaphoretic.   Psychiatric:   Unable to assess   Nursing note and vitals reviewed.           CRANIAL NERVES      CN III, IV, VI   Extraocular motions are normal.         Significant Labs: All pertinent labs within the past 24 hours have been reviewed.     Significant Imaging: I have reviewed all pertinent imaging results/findings within the past 24 hours.  I have reviewed and interpreted all pertinent imaging results/findings within the past 24 hours.    Assessment/Plan:     * Acute respiratory failure with hypoxemia  I suspect community-acquired pneumonia versus heart failure exacerbation versus both  A right lung consolidation was seen yesterday on a CT scan obtained at [facility]. Also with fever, elevated procalcitonin and respiratory distress which raises concerns for pneumonia. Has no leukocytosis. Agree with broad-spectrum antibiotics.  Blood and  sputum cultures are pending.    Patient does have severely depressed left ventricular systolic function in setting of continuous cocaine use.  BNP is also very elevated.  I could not appreciate rales on exam nor significant edema x-ray however this is not a main patient does not have pulmonary edema. Therefore I agree with continued IV diuresis.    D-dimer elevated.  Will obtain lower extremity ultrasounds to rule out DVT.  If negative, will obtain a V/Q scan but will consider a CTA of chest as kidney function is better.  Heparin for DVT prophylaxis    Continue BiPAP. Wean as tolerated.  Patient is full code    Pulmonary and cardiology on board for co-anagement      Acute on chronic systolic heart failure  Per records from [facility], patient has underlying nonischemic cardiomyopathy with an estimated EF of less than 20%  Has documented poor dietary compliance and issues with cocaine abuse  I suspect acute exacerbation is due to both of these  There is also suspected community-acquired pneumonia of the right upper lung per a CT obtained at [facility] yesterday  Will continue with IV diuresis as tolerated and start broad-spectrum antibiotics   Blood cultures are pending will order sputum cultures as well  Further management of respiratory failure as above      Cocaine use disorder, severe, dependence  This is a continuous problem but is negatively impacting patient's health  Will educate on cessation when appropriate      Fever  Unknown etiology  Could be infectious versus reactive from his respiratory distress  On empiric antibiotics. Workup for sepsis in progress  Vitals every 15 min      Acute renal failure superimposed on stage 3 chronic kidney disease  Patient's baseline creatinine is around 1.5  Creatinine today is 2.5.  About 115 cc so far after a dose of Lasix and Sheikh placed in the ED  Will continue with strict Is&Os, BMP daily and IV diuresis  Will consider Nephrology consultation      NICM (nonischemic  cardiomyopathy)  Likely secondary to continued cocaine use  Patient has a LifeVest on. Tentative plan for AICD placement next month, per notes  Patient underwent left heart catheterization in February of this year.  Showed clean coronary arteries  Checking troponins. No need for statin or aspirin at this time  Cardiac monitoring. Repeat Echo pending.         VTE Risk Mitigation (From admission, onward)        Ordered     heparin (porcine) injection 5,000 Units  Every 12 hours      06/13/19 1357     IP VTE HIGH RISK PATIENT  Once      06/13/19 1127     Place MACIE hose  Until discontinued      06/13/19 1127         Critical care time spent on the evaluation and treatment of severe organ dysfunction, review of pertinent labs and imaging studies, discussions with consulting providers and discussions with patient/family: > 35 minutes.    Barbara Franco MD  Department of Hospital Medicine   [facility]

## 2019-06-14 NOTE — HPI
53 year old male with CHF(EF 15%) followed at [facility], CKD 3, HTN, cocaine abuse who presents with fever and SOB. Patient has multiple recent clinic and hospital visits at [facility]. Patient was recently treated for PNA. Patient was in the ER at [facility] 1 day ago. Given IV Lasix and discharged.   Patient has lifevest in place. [facility] following for ICD placement.   Pulmonary consulted for acute hypoxic respiratory failure. Patient currently on Bipap.

## 2019-06-14 NOTE — NURSING
[facility]  ICU Multidisciplinary Bedside Rounds   SUMMARY     Date: 6/14/2019    Prehospitalization: Home  Admit Date / LOS : 6/13/2019/ 1 days    Diagnosis: Acute respiratory failure with hypoxemia         Code Status: Full Code             Urinary Cath/Order/Justification:Critically Ill in ICU                 Pain Management: PO      Rhythm: NSR    Respiratory Device: Bipap           Mobility: Bedrest  Stress Ulcer Prophylaxis: No              Noteworthy Labs:  none    Needs from Care Team: none     ICU LOS 18h  Level of Care: OK to Transfer

## 2019-06-14 NOTE — SUBJECTIVE & OBJECTIVE
Interval History:     Review of Systems   Unable to perform ROS: acuity of condition     Objective:     Vital Signs (Most Recent):  Temp: 98.3 °F (36.8 °C) (06/14/19 0338)  Pulse: 61 (06/14/19 0715)  Resp: (!) 32 (06/14/19 0715)  BP: 118/75 (06/14/19 0700)  SpO2: 98 % (06/14/19 0715) Vital Signs (24h Range):  Temp:  [96.6 °F (35.9 °C)-103.3 °F (39.6 °C)] 98.3 °F (36.8 °C)  Pulse:  [] 61  Resp:  [10-64] 32  SpO2:  [96 %-100 %] 98 %  BP: ()/() 118/75     Weight: 75.5 kg (166 lb 7.2 oz)  Body mass index is 23.88 kg/m².     SpO2: 98 %  O2 Device (Oxygen Therapy): BiPAP      Intake/Output Summary (Last 24 hours) at 6/14/2019 0750  Last data filed at 6/14/2019 0600  Gross per 24 hour   Intake 1130 ml   Output 2330 ml   Net -1200 ml       Lines/Drains/Airways     Drain                 Urethral Catheter 06/13/19 0915 Straight-tip 16 Fr. less than 1 day          Peripheral Intravenous Line                 Peripheral IV - Single Lumen 06/13/19 18 G Left Forearm 1 day         Peripheral IV - Single Lumen 06/13/19 0905 20 G Right Antecubital less than 1 day                Physical Exam   Constitutional: He is oriented to person, place, and time. He appears well-developed and well-nourished.   HENT:   Head: Normocephalic and atraumatic.   Eyes: Pupils are equal, round, and reactive to light. Conjunctivae are normal.   Neck: Normal range of motion. Neck supple.   Cardiovascular: Normal rate, normal heart sounds and intact distal pulses.   Pulmonary/Chest: Effort normal. He has rales.   Abdominal: Soft. Bowel sounds are normal.   Musculoskeletal: Normal range of motion.   Neurological: He is alert and oriented to person, place, and time.   Skin: Skin is warm and dry.       Significant Labs: All pertinent lab results from the last 24 hours have been reviewed.    Significant Imaging: Echocardiogram: 2D echo with color flow doppler: No results found for this or any previous visit.

## 2019-06-14 NOTE — SUBJECTIVE & OBJECTIVE
Past Medical History:   Diagnosis Date    Cardiomyopathy     CHF (congestive heart failure)     CKD (chronic kidney disease), stage III     Cocaine abuse     Hyperlipidemia     Hypertension     Pneumonia     Proteinuria     Pulmonary hypertension     Renal disorder     Respiratory failure with hypoxia     Stroke        History reviewed. No pertinent surgical history.    Review of patient's allergies indicates:  No Known Allergies    Family History     None        Tobacco Use    Smoking status: Never Smoker    Smokeless tobacco: Never Used   Substance and Sexual Activity    Alcohol use: Yes    Drug use: Yes     Types: Cocaine    Sexual activity: Not on file         Review of Systems   Unable to perform ROS: Acuity of condition     Objective:     Vital Signs (Most Recent):  Temp: 98 °F (36.7 °C) (06/13/19 1900)  Pulse: 67 (06/13/19 2107)  Resp: (!) 35 (06/13/19 2107)  BP: 112/76 (06/13/19 2102)  SpO2: 98 % (06/13/19 2107) Vital Signs (24h Range):  Temp:  [96.6 °F (35.9 °C)-103.3 °F (39.6 °C)] 98 °F (36.7 °C)  Pulse:  [] 67  Resp:  [31-64] 35  SpO2:  [96 %-100 %] 98 %  BP: ()/() 112/76     Weight: 90.3 kg (199 lb)  Body mass index is 28.55 kg/m².      Intake/Output Summary (Last 24 hours) at 6/13/2019 2114  Last data filed at 6/13/2019 1900  Gross per 24 hour   Intake 670 ml   Output 980 ml   Net -310 ml       Physical Exam   Constitutional: He is oriented to person, place, and time. He appears well-developed and well-nourished. No distress.   HENT:   Head: Normocephalic and atraumatic.   Eyes: Pupils are equal, round, and reactive to light. EOM are normal.   Neck: Normal range of motion. Neck supple.   Pulmonary/Chest: He has no wheezes.   Bipap in place. Tachypnea.   Abdominal: Soft. Bowel sounds are normal.   Musculoskeletal: Normal range of motion. He exhibits no edema.   Neurological: He is alert and oriented to person, place, and time.   Skin: Skin is warm and dry. He is not  diaphoretic. No erythema.   Nursing note and vitals reviewed.      Vents:  Oxygen Concentration (%): 30 (06/13/19 2052)    Lines/Drains/Airways     Drain                 Urethral Catheter 06/13/19 0915 Straight-tip 16 Fr. less than 1 day          Peripheral Intravenous Line                 Peripheral IV - Single Lumen 06/13/19 0905 20 G Right Antecubital less than 1 day         Peripheral IV - Single Lumen 06/13/19 18 G Left Forearm less than 1 day                Significant Labs:    CBC/Anemia Profile:  Recent Labs   Lab 06/13/19  0910   WBC 7.53   HGB 15.3   HCT 46.7      MCV 87   RDW 15.6*        Chemistries:  Recent Labs   Lab 06/13/19  0910      K 4.6   CL 96   CO2 25   BUN 34*   CREATININE 2.5*   CALCIUM 9.9   ALBUMIN 4.0   PROT 8.0   BILITOT 1.3*   ALKPHOS 64   ALT 19   AST 36   MG 1.9   PHOS 4.2       All pertinent labs within the past 24 hours have been reviewed.    Significant Imaging:   I have reviewed all pertinent imaging results/findings within the past 24 hours.

## 2019-06-15 VITALS
DIASTOLIC BLOOD PRESSURE: 76 MMHG | RESPIRATION RATE: 16 BRPM | HEIGHT: 70 IN | OXYGEN SATURATION: 94 % | SYSTOLIC BLOOD PRESSURE: 142 MMHG | TEMPERATURE: 98 F | BODY MASS INDEX: 23.77 KG/M2 | HEART RATE: 55 BPM | WEIGHT: 166 LBS

## 2019-06-15 LAB
ALBUMIN SERPL BCP-MCNC: 3 G/DL (ref 3.5–5.2)
ALP SERPL-CCNC: 48 U/L (ref 55–135)
ALT SERPL W/O P-5'-P-CCNC: 29 U/L (ref 10–44)
ANION GAP SERPL CALC-SCNC: 10 MMOL/L (ref 8–16)
AST SERPL-CCNC: 39 U/L (ref 10–40)
BACTERIA SPEC AEROBE CULT: NORMAL
BACTERIA UR CULT: NO GROWTH
BILIRUB SERPL-MCNC: 0.5 MG/DL (ref 0.1–1)
BUN SERPL-MCNC: 34 MG/DL (ref 6–20)
CALCIUM SERPL-MCNC: 9.3 MG/DL (ref 8.7–10.5)
CHLORIDE SERPL-SCNC: 99 MMOL/L (ref 95–110)
CO2 SERPL-SCNC: 32 MMOL/L (ref 23–29)
CREAT SERPL-MCNC: 1.5 MG/DL (ref 0.5–1.4)
EST. GFR  (AFRICAN AMERICAN): >60 ML/MIN/1.73 M^2
EST. GFR  (NON AFRICAN AMERICAN): 52 ML/MIN/1.73 M^2
GLUCOSE SERPL-MCNC: 84 MG/DL (ref 70–110)
GRAM STN SPEC: NORMAL
POTASSIUM SERPL-SCNC: 3.9 MMOL/L (ref 3.5–5.1)
PROT SERPL-MCNC: 6.5 G/DL (ref 6–8.4)
SODIUM SERPL-SCNC: 141 MMOL/L (ref 136–145)

## 2019-06-15 PROCEDURE — 36415 COLL VENOUS BLD VENIPUNCTURE: CPT

## 2019-06-15 PROCEDURE — 25000003 PHARM REV CODE 250: Performed by: INTERNAL MEDICINE

## 2019-06-15 PROCEDURE — 80053 COMPREHEN METABOLIC PANEL: CPT

## 2019-06-15 PROCEDURE — 63600175 PHARM REV CODE 636 W HCPCS: Performed by: INTERNAL MEDICINE

## 2019-06-15 RX ADMIN — FUROSEMIDE 40 MG: 10 INJECTION, SOLUTION INTRAVENOUS at 09:06

## 2019-06-15 RX ADMIN — GUAIFENESIN 600 MG: 600 TABLET, EXTENDED RELEASE ORAL at 09:06

## 2019-06-15 RX ADMIN — PANTOPRAZOLE SODIUM 40 MG: 40 TABLET, DELAYED RELEASE ORAL at 09:06

## 2019-06-15 RX ADMIN — HEPARIN SODIUM 5000 UNITS: 5000 INJECTION, SOLUTION INTRAVENOUS; SUBCUTANEOUS at 09:06

## 2019-06-15 NOTE — NURSING
Bedside shift report received from  CARLOS Medina. Communication board has been updated. NAD noted. Will continue to monitor. Patient is sleeping upon entering but easily aroused and stays awake during report.

## 2019-06-15 NOTE — PROGRESS NOTES
[facility]  Pulmonology  Progress Note    Patient Name: Terry Guevara  MRN: [id number]  Admission Date: 6/13/2019  Hospital Length of Stay: 1 days  Code Status: Full Code  Attending Provider: Barbara Boyer MD  Primary Care Provider: To Obtain Unable   Principal Problem: Acute respiratory failure with hypoxemia    Subjective:     Interval History:Admits recent cocaine use. On 1L NC.    Review of Systems   Constitutional: Negative.    Respiratory: Negative.    Cardiovascular: Negative.    Gastrointestinal: Negative.      Objective:     Vital Signs (Most Recent):  Temp: 97.9 °F (36.6 °C) (06/14/19 2002)  Pulse: 77 (06/14/19 2002)  Resp: 18 (06/14/19 2002)  BP: 117/81 (06/14/19 2002)  SpO2: 97 % (06/14/19 2002) Vital Signs (24h Range):  Temp:  [97.6 °F (36.4 °C)-99.4 °F (37.4 °C)] 97.9 °F (36.6 °C)  Pulse:  [57-84] 77  Resp:  [10-52] 18  SpO2:  [96 %-100 %] 97 %  BP: ()/(61-93) 117/81     Weight: 75.3 kg (166 lb)  Body mass index is 23.82 kg/m².    Intake/Output Summary (Last 24 hours) at 6/14/2019 2202  Last data filed at 6/14/2019 2000  Gross per 24 hour   Intake 900 ml   Output 2760 ml   Net -1860 ml      Physical Exam   Constitutional: He is oriented to person, place, and time. He appears well-developed. No distress.   Cardiovascular: Normal rate and regular rhythm.   Pulmonary/Chest: Effort normal and breath sounds normal.   Breathing comfortably on low flow NC   Abdominal: Soft. Bowel sounds are normal.   Musculoskeletal: He exhibits no edema.   Neurological: He is alert and oriented to person, place, and time.   Skin: He is not diaphoretic.   Psychiatric: He has a normal mood and affect. His behavior is normal. Judgment and thought content normal.   Nursing note and vitals reviewed.      Significant Labs: All pertinent labs within the past 24 hours have been reviewed.    Significant Imaging: I have reviewed all pertinent imaging results/findings within the past 24 hours.  I have reviewed  and interpreted all pertinent imaging results/findings within the past 24 hours.    Assessment/Plan:     * Acute respiratory failure with hypoxemia  Patient with minimal hypoxia. Increased work of breathing. ABG well compensated.   Improved with diuresis.   On 1 L NC.   Wean as tolerated. Patient will likely not need home oxygen but consider walking in AM.     Cocaine use disorder, severe, dependence  Cocaine positive. Likely contributing to most of his decompensation. Counseled patient on avoidance.     Acute renal failure superimposed on stage 3 chronic kidney disease  Good UOP.     Acute on chronic systolic heart failure  BNP elevated. Improvement with diuresis.       Will sign off. Call with questions.      Domenic Ward MD  Pulmonology  [facility]

## 2019-06-15 NOTE — NURSING
Discharge instructions given to patient in blue folder. Belongings given to patient. Both IVs removed and cardiac monitor. No prescriptions were written for the patient. Patient folder at desk was cleaned and put in filing bin.

## 2019-06-15 NOTE — PROGRESS NOTES
[facility] CASE MANAGEMENT                  WRITTEN DISCHARGE INFORMATION      APPOINTMENTS AND RESOURCES TO HELP YOU MANAGE YOUR CARE AT HOME BASED ON YOUR PREFERENCES:  (If an appointment is not scheduled for you when you leave the hospital, call your doctor to schedule a follow up visit within a week)    Follow-up Information     [facility]  In 2 days.    Why:  Please go to your scheduled cardiology appt in 1-2 days           [facility] In 1 week.    Why:  Please call to schedule your PCP appt in 1 week  Contact information:  [facility] [id number]  [phone]                       Healthy Living Instructions to HELP MANAGE YOUR CARE AT HOME:  Things You are responsible for:  1.    Getting your prescriptions filled   2.    Taking your medications as directed, DO NOT MISS ANY DOSES!  3.    Following the diet and exercise recommended by your doctor  4.    Going to your follow-up doctor appointment. This is important because it allows the doctor to monitor your progress and determine if any changes need to made to your treatment plan.  5. If you have any questions about MANAGING YOUR CARE AT HOME Call the Nurse Care Line for 24/7 Assistance [phone]       Please answer any calls you may receive from [facility]. We want to continue to support you as you manage your healthcare needs. [facility] is happy to have the opportunity to serve you.      Thank you for choosing [facility] for your healthcare needs!  Your [facility] Case Management Team,

## 2019-06-15 NOTE — ASSESSMENT & PLAN NOTE
Cocaine positive. Likely contributing to most of his decompensation. Counseled patient on avoidance.

## 2019-06-15 NOTE — PLAN OF CARE
"TN reviewed follow up appointment information as well as  "Acute Respiratory Failure discharge instructions" handout with patient using teach back. Patient stated he will notify the doctor if he has chest pain and trouble breathing. Patient is in agreement and verbalized an understanding. Placed discharge information in blue discharge folder.  TN also reviewed patient responsibility checklist with him using teach back. Patient was able to verbalize his responsibilities after discharge to manage his care at home being   1. Going to follow up appointments   2. Picking up rx from the pharmacy when discharged  3. Taking his medications as prescribed     Patient's nurse, Kayy, informed that patient can discharge from  standpoint.        06/15/19 1236   Final Note   Assessment Type Final Discharge Note   Anticipated Discharge Disposition Home   What phone number can be called within the next 1-3 days to see how you are doing after discharge?   ([phone])   Hospital Follow Up  Appt(s) scheduled? Yes   Discharge plans and expectations educations in teach back method with documentation complete? Yes   Right Care Referral Info   Post Acute Recommendation No Care     "

## 2019-06-15 NOTE — NURSING
End of shift bedside report given to CARLOS Murray. Patient in no apparent distress.     12 hour chart check complete.

## 2019-06-15 NOTE — ASSESSMENT & PLAN NOTE
Patient with minimal hypoxia. Increased work of breathing. ABG well compensated.   Improved with diuresis.   On 1 L NC.   Wean as tolerated. Patient will likely not need home oxygen but consider walking in AM.

## 2019-06-15 NOTE — PLAN OF CARE
"TN met with patient at bedside to complete discharge needs assessment. TN explained duties of case management to patient.  TN reviewed  "Blue Health Packet", "Discharge Planning Begins on Admission"  and discussed "Help at Home". Patient stated he lives at home alone. Patient's help at home is his daughter. TN also discussed his responsibilities to manage his health at home. Patient was informed to leave folder at bedside during hospital stay. Contact information added to white board.    Patient Preferred Pharmacy:   [facility] [id number] [place] [address] [place] [address] [facility] [place] [address]  [place] [address]  Phone: [phone] Fax: [phone]    Appointment Time Preference: mornings       06/15/19 1232   Discharge Assessment   Assessment Type Discharge Planning Assessment   Confirmed/corrected address and phone number on facesheet? Yes   Assessment information obtained from? Patient   Prior to hospitilization cognitive status: Alert/Oriented   Prior to hospitalization functional status: Independent   Current cognitive status: Alert/Oriented   Current Functional Status: Independent   Lives With alone   Able to Return to Prior Arrangements yes   Is patient able to care for self after discharge? Yes   Who are your caregiver(s) and their phone number(s)? Tolu- daughter    Patient's perception of discharge disposition home or selfcare   Readmission Within the Last 30 Days no previous admission in last 30 days   Patient currently being followed by outpatient case management? No   Patient currently receives any other outside agency services? No   Equipment Currently Used at Home none   Do you have any problems affording any of your prescribed medications? No   Is the patient taking medications as prescribed? yes   Does the patient have transportation home? Yes   Transportation Anticipated family or friend will provide   Does the patient receive services " at the [facility]? No   Discharge Plan A Home   Discharge Plan B Home   DME Needed Upon Discharge  none   Patient/Family in Agreement with Plan yes

## 2019-06-15 NOTE — PLAN OF CARE
Problem: Cardiac Output Decreased  Goal: Effective Cardiac Output    Intervention: Optimize Cardiac Output     06/14/19 0800 06/14/19 2010 06/15/19 0410   Prevent or Manage Embolism   VTE Prevention/Management  --  bleeding precautions maintained;ambulation promoted  --    Prevent Additional Skin Injury   Head of Bed (HOB)  --   --  HOB at 30-45 degrees   Monitor/Manage Chemotherapy Gastrointestinal Effects   Environmental Support calm environment promoted;caregiver consistency promoted;comfort object encouraged;environmental consistency promoted;personal routine supported;distractions minimized;rest periods encouraged;rooming-in facilitated  --   --          Problem: ARDS (Acute Respiratory Distress Syndrome)  Goal: Effective Oxygenation  Outcome: Ongoing (interventions implemented as appropriate)  Intervention: Optimize Oxygenation, Ventilation and Perfusion     06/15/19 0538   Manage Acute Allergic Reaction   Stabilization Measures legs elevated   Support Asthma Symptom Control   Airway/Ventilation Management pulmonary hygiene promoted;calming measures promoted

## 2019-06-15 NOTE — DISCHARGE SUMMARY
"[facility]  Discharge Summary      Patient Name: Terry Guevara  MRN: [id number]  Admission Date: 6/13/2019  Hospital Length of Stay: 2 days  Discharge Date and Time:  06/15/2019 11:05 AM  Attending Physician: Marce Goodson MD   Discharging Provider: Marce Goodson MD  Primary Care Provider: To Obtain Unable      HPI:   Mr Guevara is a 53 year old male with heart failure and cocaine use disorder who presented with progressive shortness of breath of one day in evolution. Patient stated he was just discharged from [facility] yesterday with abx for a "lung infection". Stated he started the antibiotic (unsure which one) but felt worse and therefore presenting to us. Patient strongly denies recent alcohol, cigarette or drug use. Denied chest pain. Further history limited due to dyspnea while speaking and drowsiness.     Upon arrival to the ED, patient was in severe respiratory distress and hypoxemia of 86% on room air. Had a life vest in place. Was also febrile to 103F. Placed on BiPAP and diuresed with one dose of furosemide IV. Patient felt better afterwards but remained on BiPAP. Cr of 2.5 without acid base disturbance. No leukocytosis, anemia or coagulopathy. Trop 0.1. BNP > 4,900, tox screen positive for cocaine and THC. Urine concentrated. CXR with possibly mild pulm edema but no clear consolidations. Records from [facility] waiting to be uploaded via care everywhere. Patient admitted to ICU for further management while on BiPAP.     * No surgery found *      Hospital Course:   Mr Guevara presented with acute respiratory failure with hypoxemia.  Records from [facility] obtained and reviewed.  Revealed underlying heart failure with the EF of less than 20% secondary to cocaine abuse. Patient was placed on BiPAP and given a dose of IV Lasix.  Patient felt more comfortable afterwards but remained tachypneic and unable to wean off BiPAP.  He was therefore admitted to ICU for close observation as " he was a high risk for further decompensation.  Initially concern for sepsis secondary to community-acquired pneumonia given patient's fever, respiratory distress and elevated procalcitonin.  Also due to reports of right lung consolidation seen on CT of chest obtained the day prior at [facility].  He was initiated on broad-spectrum antibiotics and blood/sputum cultures obtained. But most importantly, patient has known severely depressed LV systolic function of less than 20%, continues use of cocaine, elevated BNP and quick improvement with IV Lasix, suggests this is most likely due to acute heart failure exacerbation secondary to continues drug use.  Patient's fever then not recur, did not have leukocytosis and no large consolidation appreciated on chest x-ray. Antibiotics therefore stopped.  Acute PE was also less likely. On 6/14 the patient was weaned off BiPAP to low-flow nasal cannula.  Remained stable.was transferred to Telemetry and remains stable,she was not on BB duo to cocaine abuse,at DC time he was stable on RA,walked with no difficulty, and remains table on RA,patient has been discharged home with CHF medications ,but no BB, and follow up with PCP and cardiology in [facility] in next few days.     Consults:   Consults (From admission, onward)        Status Ordering Provider     Inpatient consult to Cardiology  Once     Provider:  Adam Hubbard MD    Completed KIA HINTON     Inpatient consult to Pulmonology  Once     Provider:  Domenic Ward MD    Completed GENI PALAFOX new Assessment & Plan notes have been filed under this hospital service since the last note was generated.  Service: Hospital Medicine    Final Active Diagnoses:    Diagnosis Date Noted POA    PRINCIPAL PROBLEM:  Acute respiratory failure with hypoxemia [J96.01] 06/13/2019 Yes    Acute heart failure [I50.9] 06/14/2019 Yes    Acute on chronic systolic heart failure [I50.23] 06/13/2019 Yes    NICM (nonischemic  cardiomyopathy) [I42.8] 06/13/2019 Yes    Acute renal failure superimposed on stage 3 chronic kidney disease [N17.9, N18.3] 06/13/2019 Yes    Fever [R50.9] 06/13/2019 Yes    Cocaine use disorder, severe, dependence [F14.20] 06/13/2019 Yes      Problems Resolved During this Admission:       Discharged Condition: stable    Disposition: Home or Self Care    Follow Up:  Follow-up Information     [facility]  In 2 days.               Patient Instructions:      Activity as tolerated       Significant Diagnostic Studies: Labs:   BMP:   Recent Labs   Lab 06/14/19  0221 06/15/19  0404   GLU 77 84    141   K 4.4 3.9   CL 98 99   CO2 31* 32*   BUN 32* 34*   CREATININE 2.0* 1.5*   CALCIUM 9.1 9.3   , CBC   Recent Labs   Lab 06/14/19 0221   WBC 6.69   HGB 13.8*   HCT 42.9      , Lipid Panel No results found for: CHOL, HDL, LDLCALC, TRIG, CHOLHDL and Troponin   Recent Labs   Lab 06/13/19  0910   TROPONINI 0.172*     Radiology: X-Ray: CXR: X-Ray Chest 1 View (CXR): No results found for this visit on 06/13/19. and X-Ray Chest PA and Lateral (CXR): No results found for this visit on 06/13/19.  Cardiac Graphics: Echocardiogram:   2D echo with color flow doppler: No results found for this or any previous visit. and Transthoracic echo (TTE) complete (Cupid Only):   Results for orders placed or performed during the hospital encounter of 06/13/19   Transthoracic echo (TTE) 2D with Color Flow   Result Value Ref Range    BSA 2.11 m2    LA WIDTH 3.62 cm    AORTIC VALVE CUSP SEPERATION 2.10 cm    PV PEAK VELOCITY 0.82 cm/s    LVIDD 5.99 3.5 - 6.0 cm    IVS 1.19 (A) 0.6 - 1.1 cm    PW 1.12 (A) 0.6 - 1.1 cm    Ao root annulus 2.76 cm    LVIDS 5.64 (A) 2.1 - 4.0 cm    FS 6 28 - 44 %    LA volume 89.24 cm3    Sinus 3.18 cm    STJ 2.61 cm    Ascending aorta 2.69 cm    LV mass 297.51 g    LA size 4.37 cm    RVDD 4.43 cm    TAPSE 1.65 cm    RV S' 7.73 m/s    Left Ventricle Relative Wall Thickness 0.37 cm    AV mean gradient 2.43  mmHg    AV valve area 3.09 cm2    AV Velocity Ratio 0.94     AV index (prosthetic) 0.89     E/A ratio 1.68     E wave decelartion time 145.06 msec    LVOT diameter 2.10 cm    LVOT area 3.46 cm2    LVOT peak landry [id number] m/s    LVOT peak VTI 14.41 cm    Ao peak landry 1.02 m/s    Ao VTI 16.16 cm    LVOT stroke volume 49.89 cm3    AV peak gradient 4.16 mmHg    MV Peak E Landry 0.94 m/s    TR Max Landry 2.44 m/s    MV Peak A Landry 0.56 m/s    LV Systolic Volume 156.11 mL    LV Systolic Volume Index 74.9 mL/m2    LV Diastolic Volume 179.52 mL    LV Diastolic Volume Index 86.19 mL/m2    LA Volume Index 42.8 mL/m2    LV Mass Index 142.8 g/m2    RA Major Axis 5.42 cm    Left Atrium Minor Axis 7.44 cm    Left Atrium Major Axis 5.99 cm    Triscuspid Valve Regurgitation Peak Gradient 23.81 mmHg    RA Width 2.99 cm    Right Atrial Pressure (from IVC) 8 mmHg    TV rest pulmonary artery pressure 32 mmHg       Pending Diagnostic Studies:     None         Medications:  Reconciled Home Medications:      Medication List      CONTINUE taking these medications    aspirin 81 MG EC tablet  Commonly known as:  ECOTRIN  Take 81 mg by mouth once daily.     dextromethorphan 15 mg/5 mL syrup  Take 10 mLs by mouth 4 (four) times daily as needed for Cough. As needed for cough for up to 10 days     furosemide 40 MG tablet  Commonly known as:  LASIX  Take by mouth 2 (two) times daily. 120 mg in AM and 80 mg in evening     pravastatin 40 MG tablet  Commonly known as:  PRAVACHOL  Take 40 mg by mouth once daily.     sacubitril-valsartan 24-26 mg per tablet  Commonly known as:  ENTRESTO  Take 1 tablet by mouth 2 (two) times daily.        STOP taking these medications    azithromycin 500 MG tablet  Commonly known as:  ZITHROMAX     carvedilol 3.125 MG tablet  Commonly known as:  COREG            Indwelling Lines/Drains at time of discharge:   Lines/Drains/Airways          None          Time spent on the discharge of patient: \over 30  minutes  Patient was  seen and examined on the date of discharge and determined to be suitable for discharge.         Marce Goodson MD  Department of Hospital Medicine  [facility]

## 2019-06-15 NOTE — SUBJECTIVE & OBJECTIVE
Interval History:Admits recent cocaine use. On 1L NC.    Review of Systems   Constitutional: Negative.    Respiratory: Negative.    Cardiovascular: Negative.    Gastrointestinal: Negative.      Objective:     Vital Signs (Most Recent):  Temp: 97.9 °F (36.6 °C) (06/14/19 2002)  Pulse: 77 (06/14/19 2002)  Resp: 18 (06/14/19 2002)  BP: 117/81 (06/14/19 2002)  SpO2: 97 % (06/14/19 2002) Vital Signs (24h Range):  Temp:  [97.6 °F (36.4 °C)-99.4 °F (37.4 °C)] 97.9 °F (36.6 °C)  Pulse:  [57-84] 77  Resp:  [10-52] 18  SpO2:  [96 %-100 %] 97 %  BP: ()/(61-93) 117/81     Weight: 75.3 kg (166 lb)  Body mass index is 23.82 kg/m².    Intake/Output Summary (Last 24 hours) at 6/14/2019 2202  Last data filed at 6/14/2019 2000  Gross per 24 hour   Intake 900 ml   Output 2760 ml   Net -1860 ml      Physical Exam   Constitutional: He is oriented to person, place, and time. He appears well-developed. No distress.   Cardiovascular: Normal rate and regular rhythm.   Pulmonary/Chest: Effort normal and breath sounds normal.   Breathing comfortably on low flow NC   Abdominal: Soft. Bowel sounds are normal.   Musculoskeletal: He exhibits no edema.   Neurological: He is alert and oriented to person, place, and time.   Skin: He is not diaphoretic.   Psychiatric: He has a normal mood and affect. His behavior is normal. Judgment and thought content normal.   Nursing note and vitals reviewed.      Significant Labs: All pertinent labs within the past 24 hours have been reviewed.    Significant Imaging: I have reviewed all pertinent imaging results/findings within the past 24 hours.  I have reviewed and interpreted all pertinent imaging results/findings within the past 24 hours.

## 2019-06-18 LAB
BACTERIA BLD CULT: NORMAL
BACTERIA BLD CULT: NORMAL

## 2019-08-21 ENCOUNTER — HOSPITAL ENCOUNTER (OUTPATIENT)
Facility: HOSPITAL | Age: 54
Discharge: HOME | End: 2019-08-22
Attending: EMERGENCY MEDICINE | Admitting: EMERGENCY MEDICINE
Payer: MEDICARE

## 2019-08-21 DIAGNOSIS — R06.02 SHORTNESS OF BREATH: ICD-10-CM

## 2019-08-21 DIAGNOSIS — I50.41 ACUTE COMBINED SYSTOLIC AND DIASTOLIC CONGESTIVE HEART FAILURE: Primary | ICD-10-CM

## 2019-08-21 DIAGNOSIS — F14.20 COCAINE USE DISORDER, SEVERE, DEPENDENCE: ICD-10-CM

## 2019-08-21 DIAGNOSIS — I50.41 ACUTE COMBINED SYSTOLIC AND DIASTOLIC HEART FAILURE: ICD-10-CM

## 2019-08-21 DIAGNOSIS — R07.9 CHEST PAIN: ICD-10-CM

## 2019-08-21 LAB
ALBUMIN SERPL BCP-MCNC: 3.6 G/DL (ref 3.5–5.2)
ALP SERPL-CCNC: 60 U/L (ref 55–135)
ALT SERPL W/O P-5'-P-CCNC: 26 U/L (ref 10–44)
ANION GAP SERPL CALC-SCNC: 9 MMOL/L (ref 8–16)
AST SERPL-CCNC: 29 U/L (ref 10–40)
BASOPHILS # BLD AUTO: 0.01 K/UL (ref 0–0.2)
BASOPHILS NFR BLD: 0.2 % (ref 0–1.9)
BILIRUB SERPL-MCNC: 0.7 MG/DL (ref 0.1–1)
BNP SERPL-MCNC: 3214 PG/ML (ref 0–99)
BUN SERPL-MCNC: 23 MG/DL (ref 6–20)
CALCIUM SERPL-MCNC: 9.2 MG/DL (ref 8.7–10.5)
CHLORIDE SERPL-SCNC: 105 MMOL/L (ref 95–110)
CO2 SERPL-SCNC: 26 MMOL/L (ref 23–29)
CREAT SERPL-MCNC: 1.8 MG/DL (ref 0.5–1.4)
DIFFERENTIAL METHOD: ABNORMAL
EOSINOPHIL # BLD AUTO: 0 K/UL (ref 0–0.5)
EOSINOPHIL NFR BLD: 0.7 % (ref 0–8)
ERYTHROCYTE [DISTWIDTH] IN BLOOD BY AUTOMATED COUNT: 17.3 % (ref 11.5–14.5)
EST. GFR  (AFRICAN AMERICAN): 48 ML/MIN/1.73 M^2
EST. GFR  (NON AFRICAN AMERICAN): 42 ML/MIN/1.73 M^2
GLUCOSE SERPL-MCNC: 99 MG/DL (ref 70–110)
HCT VFR BLD AUTO: 44.7 % (ref 40–54)
HGB BLD-MCNC: 14.3 G/DL (ref 14–18)
LYMPHOCYTES # BLD AUTO: 1.1 K/UL (ref 1–4.8)
LYMPHOCYTES NFR BLD: 19.6 % (ref 18–48)
MAGNESIUM SERPL-MCNC: 1.9 MG/DL (ref 1.6–2.6)
MCH RBC QN AUTO: 28.3 PG (ref 27–31)
MCHC RBC AUTO-ENTMCNC: 32 G/DL (ref 32–36)
MCV RBC AUTO: 89 FL (ref 82–98)
MONOCYTES # BLD AUTO: 0.5 K/UL (ref 0.3–1)
MONOCYTES NFR BLD: 9.2 % (ref 4–15)
NEUTROPHILS # BLD AUTO: 3.8 K/UL (ref 1.8–7.7)
NEUTROPHILS NFR BLD: 70.5 % (ref 38–73)
PLATELET # BLD AUTO: 211 K/UL (ref 150–350)
PMV BLD AUTO: 10.4 FL (ref 9.2–12.9)
POTASSIUM SERPL-SCNC: 4 MMOL/L (ref 3.5–5.1)
PROT SERPL-MCNC: 6.9 G/DL (ref 6–8.4)
RBC # BLD AUTO: 5.05 M/UL (ref 4.6–6.2)
SODIUM SERPL-SCNC: 140 MMOL/L (ref 136–145)
TROPONIN I SERPL DL<=0.01 NG/ML-MCNC: 0.06 NG/ML (ref 0–0.03)
WBC # BLD AUTO: 5.42 K/UL (ref 3.9–12.7)

## 2019-08-21 PROCEDURE — 84484 ASSAY OF TROPONIN QUANT: CPT

## 2019-08-21 PROCEDURE — 25000003 PHARM REV CODE 250: Performed by: EMERGENCY MEDICINE

## 2019-08-21 PROCEDURE — 93005 ELECTROCARDIOGRAM TRACING: CPT

## 2019-08-21 PROCEDURE — 99291 CRITICAL CARE FIRST HOUR: CPT | Mod: 25

## 2019-08-21 PROCEDURE — 63600175 PHARM REV CODE 636 W HCPCS: Performed by: EMERGENCY MEDICINE

## 2019-08-21 PROCEDURE — 96374 THER/PROPH/DIAG INJ IV PUSH: CPT

## 2019-08-21 PROCEDURE — 80053 COMPREHEN METABOLIC PANEL: CPT

## 2019-08-21 PROCEDURE — 93010 EKG 12-LEAD: ICD-10-PCS | Mod: ,,, | Performed by: INTERNAL MEDICINE

## 2019-08-21 PROCEDURE — 85025 COMPLETE CBC W/AUTO DIFF WBC: CPT

## 2019-08-21 PROCEDURE — 83880 ASSAY OF NATRIURETIC PEPTIDE: CPT

## 2019-08-21 PROCEDURE — 83735 ASSAY OF MAGNESIUM: CPT

## 2019-08-21 PROCEDURE — 93010 ELECTROCARDIOGRAM REPORT: CPT | Mod: ,,, | Performed by: INTERNAL MEDICINE

## 2019-08-21 PROCEDURE — G0378 HOSPITAL OBSERVATION PER HR: HCPCS

## 2019-08-21 RX ORDER — FUROSEMIDE 10 MG/ML
60 INJECTION INTRAMUSCULAR; INTRAVENOUS
Status: COMPLETED | OUTPATIENT
Start: 2019-08-21 | End: 2019-08-21

## 2019-08-21 RX ORDER — ASPIRIN 325 MG
325 TABLET ORAL
Status: COMPLETED | OUTPATIENT
Start: 2019-08-21 | End: 2019-08-21

## 2019-08-21 RX ORDER — NITROGLYCERIN 0.4 MG/1
0.4 TABLET SUBLINGUAL EVERY 5 MIN PRN
Status: DISCONTINUED | OUTPATIENT
Start: 2019-08-21 | End: 2019-08-22 | Stop reason: HOSPADM

## 2019-08-21 RX ADMIN — NITROGLYCERIN 0.4 MG: 0.4 TABLET SUBLINGUAL at 08:08

## 2019-08-21 RX ADMIN — FUROSEMIDE 60 MG: 10 INJECTION, SOLUTION INTRAMUSCULAR; INTRAVENOUS at 10:08

## 2019-08-21 RX ADMIN — ASPIRIN 325 MG ORAL TABLET 325 MG: 325 PILL ORAL at 08:08

## 2019-08-21 RX ADMIN — SACUBITRIL AND VALSARTAN 1 TABLET: 24; 26 TABLET, FILM COATED ORAL at 10:08

## 2019-08-22 VITALS
OXYGEN SATURATION: 96 % | WEIGHT: 176.38 LBS | HEIGHT: 71 IN | BODY MASS INDEX: 24.69 KG/M2 | RESPIRATION RATE: 18 BRPM | HEART RATE: 83 BPM | SYSTOLIC BLOOD PRESSURE: 125 MMHG | TEMPERATURE: 98 F | DIASTOLIC BLOOD PRESSURE: 86 MMHG

## 2019-08-22 LAB
AMPHET+METHAMPHET UR QL: NEGATIVE
BARBITURATES UR QL SCN>200 NG/ML: NEGATIVE
BENZODIAZ UR QL SCN>200 NG/ML: NEGATIVE
BZE UR QL SCN: NORMAL
CANNABINOIDS UR QL SCN: NEGATIVE
CREAT UR-MCNC: 151.4 MG/DL (ref 23–375)
METHADONE UR QL SCN>300 NG/ML: NEGATIVE
OPIATES UR QL SCN: NEGATIVE
PCP UR QL SCN>25 NG/ML: NEGATIVE
TOXICOLOGY INFORMATION: NORMAL
TROPONIN I SERPL DL<=0.01 NG/ML-MCNC: 0.05 NG/ML (ref 0–0.03)
TROPONIN I SERPL DL<=0.01 NG/ML-MCNC: 0.05 NG/ML (ref 0–0.03)

## 2019-08-22 PROCEDURE — 90471 IMMUNIZATION ADMIN: CPT | Performed by: HOSPITALIST

## 2019-08-22 PROCEDURE — G0008 ADMIN INFLUENZA VIRUS VAC: HCPCS | Performed by: HOSPITALIST

## 2019-08-22 PROCEDURE — 90686 IIV4 VACC NO PRSV 0.5 ML IM: CPT | Performed by: HOSPITALIST

## 2019-08-22 PROCEDURE — G0378 HOSPITAL OBSERVATION PER HR: HCPCS

## 2019-08-22 PROCEDURE — 80307 DRUG TEST PRSMV CHEM ANLYZR: CPT

## 2019-08-22 PROCEDURE — 25000003 PHARM REV CODE 250: Performed by: EMERGENCY MEDICINE

## 2019-08-22 PROCEDURE — 36415 COLL VENOUS BLD VENIPUNCTURE: CPT

## 2019-08-22 PROCEDURE — 63600175 PHARM REV CODE 636 W HCPCS: Performed by: HOSPITALIST

## 2019-08-22 PROCEDURE — 84484 ASSAY OF TROPONIN QUANT: CPT | Mod: 91

## 2019-08-22 RX ORDER — FUROSEMIDE 40 MG/1
40 TABLET ORAL 2 TIMES DAILY
Start: 2019-08-22

## 2019-08-22 RX ORDER — ACETAMINOPHEN 325 MG/1
650 TABLET ORAL EVERY 8 HOURS PRN
Status: DISCONTINUED | OUTPATIENT
Start: 2019-08-22 | End: 2019-08-22 | Stop reason: HOSPADM

## 2019-08-22 RX ORDER — SPIRONOLACTONE 25 MG/1
25 TABLET ORAL DAILY
Qty: 30 TABLET | Refills: 3 | Status: SHIPPED | OUTPATIENT
Start: 2019-08-22

## 2019-08-22 RX ORDER — ONDANSETRON 2 MG/ML
4 INJECTION INTRAMUSCULAR; INTRAVENOUS EVERY 8 HOURS PRN
Status: DISCONTINUED | OUTPATIENT
Start: 2019-08-22 | End: 2019-08-22 | Stop reason: HOSPADM

## 2019-08-22 RX ORDER — FAMOTIDINE 20 MG/1
20 TABLET, FILM COATED ORAL DAILY
Status: DISCONTINUED | OUTPATIENT
Start: 2019-08-22 | End: 2019-08-22 | Stop reason: HOSPADM

## 2019-08-22 RX ORDER — SODIUM CHLORIDE 0.9 % (FLUSH) 0.9 %
10 SYRINGE (ML) INJECTION
Status: DISCONTINUED | OUTPATIENT
Start: 2019-08-22 | End: 2019-08-22 | Stop reason: HOSPADM

## 2019-08-22 RX ORDER — SPIRONOLACTONE 25 MG/1
25 TABLET ORAL DAILY
Status: ON HOLD | COMMUNITY
End: 2019-08-22 | Stop reason: SDUPTHER

## 2019-08-22 RX ADMIN — NITROGLYCERIN 2 INCH: 20 OINTMENT TOPICAL at 01:08

## 2019-08-22 RX ADMIN — ACETAMINOPHEN 650 MG: 325 TABLET, FILM COATED ORAL at 08:08

## 2019-08-22 RX ADMIN — FAMOTIDINE 20 MG: 20 TABLET ORAL at 08:08

## 2019-08-22 RX ADMIN — INFLUENZA VIRUS VACCINE 0.5 ML: 15; 15; 15; 15 SUSPENSION INTRAMUSCULAR at 12:08

## 2019-08-22 RX ADMIN — NITROGLYCERIN 2 INCH: 20 OINTMENT TOPICAL at 05:08

## 2019-08-22 NOTE — ED NOTES
MUSE system down and not printing EKGs from monitor. Had to perform EKG from EKG Machine. EKG will be faxed to cardiology

## 2019-08-22 NOTE — PLAN OF CARE
Problem: Adult Inpatient Plan of Care  Goal: Plan of Care Review  Outcome: Ongoing (interventions implemented as appropriate)     08/22/19 9036   Plan of Care Review   Plan of Care Reviewed With patient   Progress no change

## 2019-08-22 NOTE — DISCHARGE SUMMARY
[facility] Hospital Medicine  Discharge Summary      Patient Name: Terry Guevara  MRN: [id number]  Admission Date: 8/21/2019  Hospital Length of Stay: 0 days  Discharge Date and Time:  08/22/2019 11:49 AM  Attending Physician: Ty Segovia MD   Discharging Provider: Homa Munoz NP  Primary Care Provider: To Obtain Unable      HPI:   Mr. Guevara is a 54-year-old male with significant history for hypertension, CKD stage 3, CVA with residual stutter?, cocaine abuse, marijuana abuse, and chronic biventricular heart failure EF 15%diastolic failure (per [facility], NICM due cocaine use) who presents to the hospital for shortness of breath x3 days.  Patient admits to nonadherence with salt intake or fluid intake.  Patient ran out of entesto and spirolactone on Monday but plan to  samples at his Cardiologist office [facility].  Denies chest pain, headaches, syncope or presyncope.  Patient wears Life Vest (Patient have been seen by [facility] for ICD evaluation on 4/24/19 but was sent to ED for volume overload). Last use of cocaine on Monday (4 days ago).     EKG NSR LAFB, TWI V5 V6. Troponin trend flat pattern. CXR perihilar and mild basal interstitial opacities.     * No surgery found *      Hospital Course:   Mr. Guevara is a 53 yo male who is admitted to observation for acute on chronic biventricular heart failure EF 15% due to nonadherence to diet and medication.  EKG normal sinus rhythm.  Troponin trend flat pattern in setting of CKD and CHF.  Patient responded to IV Lasix with net negative 1.5 L overnight.  Symptoms improved and the and patient stable for discharge home.  Patient will stop by [facility] to  samples of entresto, lasix and spirolactone.  Encouraged patient adherence with low-salt and fluid intake to 1 L per day.  Patient stable for discharge home with close follow-up with [facility] vascular and heart appointment on 8/28/2019 at 1:00 p.m.      Consults:     No new Assessment & Plan notes have been filed under this hospital service since the last note was generated.  Service: Hospital Medicine    Final Active Diagnoses:    Diagnosis Date Noted POA    PRINCIPAL PROBLEM:  Acute combined systolic and diastolic congestive heart failure [I50.41] 08/21/2019 Yes      Problems Resolved During this Admission:       Discharged Condition: stable    Disposition: Home or Self Care    Follow Up:  Follow-up Information     Follow up On 8/28/2019.    Why:  8/28/19 1 pm   Contact information:  [facility] [place]  [address] [facility]  [phone]               Patient Instructions:      Diet Cardiac     Notify your health care provider if you experience any of the following:  difficulty breathing or increased cough     Notify your health care provider if you experience any of the following:  increased confusion or weakness     Activity as tolerated       Significant Diagnostic Studies: Labs:   BMP:   Recent Labs   Lab 08/21/19 2102   GLU 99      K 4.0      CO2 26   BUN 23*   CREATININE 1.8*   CALCIUM 9.2   MG 1.9   , CMP   Recent Labs   Lab 08/21/19 2102      K 4.0      CO2 26   GLU 99   BUN 23*   CREATININE 1.8*   CALCIUM 9.2   PROT 6.9   ALBUMIN 3.6   BILITOT 0.7   ALKPHOS 60   AST 29   ALT 26   ANIONGAP 9   ESTGFRAFRICA 48*   EGFRNONAA 42*   , CBC   Recent Labs   Lab 08/21/19 2102   WBC 5.42   HGB 14.3   HCT 44.7      , INR   Lab Results   Component Value Date    INR 1.2 06/13/2019   , Lipid Panel No results found for: CHOL, HDL, LDLCALC, TRIG, CHOLHDL, Troponin   Recent Labs   Lab 08/22/19  0817   TROPONINI 0.048*    and A1C: No results for input(s): HGBA1C in the last 4320 hours.    Pending Diagnostic Studies:     None         Medications:  Reconciled Home Medications:      Medication List      CHANGE how you take these medications    furosemide 40 MG tablet  Commonly known as:  LASIX  Take 1 tablet (40  mg total) by mouth 2 (two) times daily. Per patient, 120 mg in AM, 80 mg noon and 80 mg in evening  What changed:    · how much to take  · additional instructions        CONTINUE taking these medications    aspirin 81 MG EC tablet  Commonly known as:  ECOTRIN  Take 81 mg by mouth once daily.     dextromethorphan 15 mg/5 mL syrup  Take 10 mLs by mouth 4 (four) times daily as needed for Cough. As needed for cough for up to 10 days     pravastatin 40 MG tablet  Commonly known as:  PRAVACHOL  Take 40 mg by mouth once daily.     sacubitril-valsartan 24-26 mg per tablet  Commonly known as:  ENTRESTO  Take 1 tablet by mouth 2 (two) times daily.     spironolactone 25 MG tablet  Commonly known as:  ALDACTONE  Take 1 tablet (25 mg total) by mouth once daily.            Indwelling Lines/Drains at time of discharge:   Lines/Drains/Airways          None          Time spent on the discharge of patient: 35 minutes  Patient was seen and examined on the date of discharge and determined to be suitable for discharge.         Homa Munoz NP  Department of Hospital Medicine  [facility]

## 2019-08-22 NOTE — ASSESSMENT & PLAN NOTE
Mr. Guevara is a 55 yo male who is admitted to observation for acute on chronic biventricular heart failure EF 15% due to nonadherence to diet and medication.  EKG normal sinus rhythm.  Troponin trend flat pattern in setting of CKD and CHF.  Patient responded to IV Lasix with net negative 1.5 L overnight.  Symptoms improved and the and patient stable for discharge home.  Patient will stop by [facility] to  samples of entresto, lasix and spirolactone.  Encouraged patient adherence with low-salt and fluid intake to 1 L per day.  Patient stable for discharge home with close follow-up with [facility] heart appointment on 8/28/2019 at 1:00 p.m.

## 2019-08-22 NOTE — HOSPITAL COURSE
Mr. Guevara is a 53 yo male who is admitted to observation for acute on chronic biventricular heart failure EF 15% due to nonadherence to diet and medication.  EKG normal sinus rhythm.  Troponin trend flat pattern in setting of CKD and CHF.  Patient responded to IV Lasix with net negative 1.5 L overnight.  Symptoms improved and the and patient stable for discharge home.  Patient will stop by [facility] to  samples of entresto, lasix and spirolactone.  Encouraged patient adherence with low-salt and fluid intake to 1 L per day.  Patient stable for discharge home with close follow-up with [facility] heart appointment on 8/28/2019 at 1:00 p.m.

## 2019-08-22 NOTE — SUBJECTIVE & OBJECTIVE
Past Medical History:   Diagnosis Date    Cardiomyopathy     CHF (congestive heart failure)     CKD (chronic kidney disease), stage III     Cocaine abuse     Hyperlipidemia     Hypertension     Pneumonia     Proteinuria     Pulmonary hypertension     Renal disorder     Respiratory failure with hypoxia     Stroke        History reviewed. No pertinent surgical history.    Review of patient's allergies indicates:  No Known Allergies    No current facility-administered medications on file prior to encounter.      Current Outpatient Medications on File Prior to Encounter   Medication Sig    aspirin (ECOTRIN) 81 MG EC tablet Take 81 mg by mouth once daily.    pravastatin (PRAVACHOL) 40 MG tablet Take 40 mg by mouth once daily.    sacubitril-valsartan (ENTRESTO) 24-26 mg per tablet Take 1 tablet by mouth 2 (two) times daily.    [DISCONTINUED] furosemide (LASIX) 40 MG tablet Take by mouth 2 (two) times daily. 120 mg in AM and 80 mg in evening    [DISCONTINUED] spironolactone (ALDACTONE) 25 MG tablet Take 25 mg by mouth once daily.    dextromethorphan 15 mg/5 mL syrup Take 10 mLs by mouth 4 (four) times daily as needed for Cough. As needed for cough for up to 10 days     Family History     None        Tobacco Use    Smoking status: Never Smoker    Smokeless tobacco: Never Used   Substance and Sexual Activity    Alcohol use: Yes    Drug use: Yes     Types: Cocaine    Sexual activity: Not on file     Review of Systems   Constitutional: Negative.    HENT: Negative.    Eyes: Negative.    Respiratory: Positive for shortness of breath. Negative for chest tightness.    Cardiovascular: Negative.  Negative for palpitations.   Gastrointestinal: Negative.    Endocrine: Negative.    Genitourinary: Negative.    Musculoskeletal: Negative.    Skin: Negative.    Neurological: Negative.    Hematological: Negative.    Psychiatric/Behavioral: Negative.      Objective:     Vital Signs (Most Recent):  Temp: 98 °F (36.7 °C)  (08/22/19 1115)  Pulse: 83 (08/22/19 1115)  Resp: 18 (08/22/19 1115)  BP: 125/86 (08/22/19 1115)  SpO2: 96 % (08/22/19 1115) Vital Signs (24h Range):  Temp:  [97.8 °F (36.6 °C)-98.6 °F (37 °C)] 98 °F (36.7 °C)  Pulse:  [71-87] 83  Resp:  [17-51] 18  SpO2:  [90 %-100 %] 96 %  BP: (101-142)/() 125/86     Weight: 80 kg (176 lb 5.9 oz)  Body mass index is 24.6 kg/m².    Physical Exam   Constitutional: He is oriented to person, place, and time. He appears well-developed and well-nourished. No distress.   HENT:   Head: Atraumatic.   Eyes: EOM are normal.   Neck: Neck supple.   Cardiovascular: Normal rate.   Pulmonary/Chest: Effort normal and breath sounds normal.   Abdominal: Soft. Bowel sounds are normal.   Musculoskeletal: Normal range of motion.   Neurological: He is oriented to person, place, and time.   Skin: Skin is warm and dry.   Psychiatric: He has a normal mood and affect.         CRANIAL NERVES     CN III, IV, VI   Extraocular motions are normal.        Significant Labs: All pertinent labs within the past 24 hours have been reviewed.    Significant Imaging: I have reviewed and interpreted all pertinent imaging results/findings within the past 24 hours.

## 2019-08-22 NOTE — PROGRESS NOTES

## 2019-08-22 NOTE — PLAN OF CARE
08/22/19 1020   Discharge Assessment   Assessment Type Discharge Planning Assessment   Assessment information obtained from? Medical Record   Prior to hospitilization cognitive status: Alert/Oriented   Prior to hospitalization functional status: Independent   Current cognitive status: Alert/Oriented   Current Functional Status: Independent   Facility Arrived From: home   Lives With spouse   Able to Return to Prior Arrangements yes   Is patient able to care for self after discharge? Yes   Who are your caregiver(s) and their phone number(s)? [place]- [phone]   Patient's perception of discharge disposition home or selfcare   Readmission Within the Last 30 Days no previous admission in last 30 days   Patient currently being followed by outpatient case management? No   Patient currently receives any other outside agency services? No   Equipment Currently Used at Home none   Do you have any problems affording any of your prescribed medications? No   Is the patient taking medications as prescribed? yes   Does the patient have transportation home? Yes  (pt reported to [place] that his daughter does not get off of work till 4;30 and woudl not get here till around 5:30pm.  [place] inquired if pt had keys to home was informed that he does have keys .  [place] to follow )   Transportation Anticipated family or friend will provide   Does the patient receive services at the [facility]? No   Discharge Plan A Home with family   Discharge Plan B Home with family  (with already scheduled with [facility])   DME Needed Upon Discharge  none   Patient/Family in Agreement with Plan yes     [id number] - [place], [place]  [address]  [facility] [place] [address]  Phone: [phone] Fax: [phone]

## 2019-08-22 NOTE — H&P
[facility] Hospital Medicine  History & Physical    Patient Name: Terry Guevara  MRN: [id number]  Admission Date: 8/21/2019  Attending Physician: Ty Segovia MD   Primary Care Provider: To Obtain Unable         Patient information was obtained from patient and ER records.     Subjective:     Principal Problem:Acute combined systolic and diastolic congestive heart failure    Chief Complaint:   Chief Complaint   Patient presents with    Shortness of Breath     pt complains of sob x 3 days. states has had intermitten episodes of chest pain / thigness also x 3 days. had one episode of nausea & vomiting yesterday. pt is wearing life vest x several months         HPI: Mr. Guevara is a 54-year-old male with significant history for hypertension, CKD stage 3, CVA with residual stutter?, cocaine abuse, marijuana abuse, and chronic biventricular heart failure EF 15%diastolic failure (per [facility], [facility] due cocaine use) who presents to the hospital for shortness of breath x3 days.  Patient admits to nonadherence with salt intake or fluid intake.  Patient ran out of entesto and spirolactone on Monday but plan to  samples at his Cardiologist office [facility].  Denies chest pain, headaches, syncope or presyncope.  Patient wears Life Vest (Patient have been seen by [facility] for ICD evaluation on 4/24/19 but was sent to ED for volume overload). Last use of cocaine on Monday (4 days ago).     EKG NSR LAFB, TWI V5 V6. Troponin trend flat pattern. CXR perihilar and mild basal interstitial opacities.     Past Medical History:   Diagnosis Date    Cardiomyopathy     CHF (congestive heart failure)     CKD (chronic kidney disease), stage III     Cocaine abuse     Hyperlipidemia     Hypertension     Pneumonia     Proteinuria     Pulmonary hypertension     Renal disorder     Respiratory failure with hypoxia     Stroke        History reviewed. No pertinent surgical history.    Review of patient's  allergies indicates:  No Known Allergies    No current facility-administered medications on file prior to encounter.      Current Outpatient Medications on File Prior to Encounter   Medication Sig    aspirin (ECOTRIN) 81 MG EC tablet Take 81 mg by mouth once daily.    pravastatin (PRAVACHOL) 40 MG tablet Take 40 mg by mouth once daily.    sacubitril-valsartan (ENTRESTO) 24-26 mg per tablet Take 1 tablet by mouth 2 (two) times daily.    [DISCONTINUED] furosemide (LASIX) 40 MG tablet Take by mouth 2 (two) times daily. 120 mg in AM and 80 mg in evening    [DISCONTINUED] spironolactone (ALDACTONE) 25 MG tablet Take 25 mg by mouth once daily.    dextromethorphan 15 mg/5 mL syrup Take 10 mLs by mouth 4 (four) times daily as needed for Cough. As needed for cough for up to 10 days     Family History     None        Tobacco Use    Smoking status: Never Smoker    Smokeless tobacco: Never Used   Substance and Sexual Activity    Alcohol use: Yes    Drug use: Yes     Types: Cocaine    Sexual activity: Not on file     Review of Systems   Constitutional: Negative.    HENT: Negative.    Eyes: Negative.    Respiratory: Positive for shortness of breath. Negative for chest tightness.    Cardiovascular: Negative.  Negative for palpitations.   Gastrointestinal: Negative.    Endocrine: Negative.    Genitourinary: Negative.    Musculoskeletal: Negative.    Skin: Negative.    Neurological: Negative.    Hematological: Negative.    Psychiatric/Behavioral: Negative.      Objective:     Vital Signs (Most Recent):  Temp: 98 °F (36.7 °C) (08/22/19 1115)  Pulse: 83 (08/22/19 1115)  Resp: 18 (08/22/19 1115)  BP: 125/86 (08/22/19 1115)  SpO2: 96 % (08/22/19 1115) Vital Signs (24h Range):  Temp:  [97.8 °F (36.6 °C)-98.6 °F (37 °C)] 98 °F (36.7 °C)  Pulse:  [71-87] 83  Resp:  [17-51] 18  SpO2:  [90 %-100 %] 96 %  BP: (101-142)/() 125/86     Weight: 80 kg (176 lb 5.9 oz)  Body mass index is 24.6 kg/m².    Physical Exam    Constitutional: He is oriented to person, place, and time. He appears well-developed and well-nourished. No distress.   HENT:   Head: Atraumatic.   Eyes: EOM are normal.   Neck: Neck supple.   Cardiovascular: Normal rate.   Pulmonary/Chest: Effort normal and breath sounds normal.   Abdominal: Soft. Bowel sounds are normal.   Musculoskeletal: Normal range of motion.   Neurological: He is oriented to person, place, and time.   Skin: Skin is warm and dry.   Psychiatric: He has a normal mood and affect.         CRANIAL NERVES     CN III, IV, VI   Extraocular motions are normal.        Significant Labs: All pertinent labs within the past 24 hours have been reviewed.    Significant Imaging: I have reviewed and interpreted all pertinent imaging results/findings within the past 24 hours.    Assessment/Plan:     * Acute combined systolic and diastolic congestive heart failure  Mr. Guevara is a 55 yo male who is admitted to observation for acute on chronic biventricular heart failure EF 15% due to nonadherence to diet and medication.  EKG normal sinus rhythm.  Troponin trend flat pattern in setting of CKD and CHF.  Patient responded to IV Lasix with net negative 1.5 L overnight.  Symptoms improved and the and patient stable for discharge home.  Patient will stop by [facility] to  samples of entresto, lasix and spirolactone.  Encouraged patient adherence with low-salt and fluid intake to 1 L per day.  Patient stable for discharge home with close follow-up with [facility] heart appointment on 8/28/2019 at 1:00 p.m.      VTE Risk Mitigation (From admission, onward)        Ordered     IP VTE HIGH RISK PATIENT  Once      08/22/19 0037     Place MACIE hose  Until discontinued      08/22/19 0037             Homa Munoz NP  Department of Hospital Medicine   [facility]

## 2019-08-22 NOTE — ED PROVIDER NOTES
Encounter Date: 8/21/2019    SCRIBE #1 NOTE: I, Sharmin Hernandez, am scribing for, and in the presence of, Pedro Harris MD.       History     Chief Complaint   Patient presents with    Shortness of Breath     pt complains of sob x 3 days. states has had intermitten episodes of chest pain / thigness also x 3 days. had one episode of nausea & vomiting yesterday. pt is wearing life vest x several months      54-year-old male with PMHx of CHF, HTN, HLD, CKD, cardiomyopathy, and stroke presents to ED with complaints of SOB and intermittent chest pain that worsened after discontinuing Entresto medication Monday.  Symptoms typical previous CHF exacerbations.  He also notes leg swelling, palpitations, nausea, and vomiting. He reports becoming winded with any movement and notes difficulty is speech secondary to SOB. Patient reports compliancy with medications. Patient has been wearing a life vest for the past 1.5 months. He notes appointment with cardiologist next week for stent placement. He denies being on oxygen at home and notes using 3 pillows when laying in bed. No other symptoms noted.      The history is provided by the patient. No  was used.     Review of patient's allergies indicates:  No Known Allergies  Past Medical History:   Diagnosis Date    Cardiomyopathy     CHF (congestive heart failure)     CKD (chronic kidney disease), stage III     Cocaine abuse     Hyperlipidemia     Hypertension     Pneumonia     Proteinuria     Pulmonary hypertension     Renal disorder     Respiratory failure with hypoxia     Stroke      No past surgical history on file.  No family history on file.  Social History     Tobacco Use    Smoking status: Never Smoker    Smokeless tobacco: Never Used   Substance Use Topics    Alcohol use: Yes    Drug use: Yes     Types: Cocaine     Review of Systems   Constitutional: Negative for chills, diaphoresis and fever.   HENT: Negative for congestion, dental  problem, sinus pain and sore throat.    Eyes: Negative.    Respiratory: Positive for chest tightness and shortness of breath. Negative for cough and wheezing.    Cardiovascular: Positive for chest pain, palpitations and leg swelling.   Gastrointestinal: Positive for nausea and vomiting. Negative for abdominal pain, blood in stool and diarrhea.        NO melena or rectal bleeding   Genitourinary: Negative for decreased urine volume, dysuria, flank pain and frequency.   Musculoskeletal: Negative for arthralgias, back pain and joint swelling.   Skin: Negative for rash and wound.   Neurological: Negative for dizziness, syncope, speech difficulty, weakness, numbness and headaches.        No numbness   Psychiatric/Behavioral: Negative for confusion.   All other systems reviewed and are negative.      Physical Exam     Initial Vitals [08/21/19 2041]   BP Pulse Resp Temp SpO2   (!) 142/112 82 (!) 22 97.9 °F (36.6 °C) 100 %      MAP       --         Physical Exam    Nursing note and vitals reviewed.  Constitutional: He appears well-developed and well-nourished. He is not diaphoretic. No distress.   HENT:   Head: Normocephalic and atraumatic.   Nose: Nose normal.   Mouth/Throat: Oropharynx is clear and moist.   Eyes: Conjunctivae and EOM are normal. Pupils are equal, round, and reactive to light. Right eye exhibits no discharge. Left eye exhibits no discharge. No scleral icterus.   Neck: Neck supple. No tracheal deviation present.   Cardiovascular: Regular rhythm and normal heart sounds. Tachycardia present.    No murmur heard.  Pulmonary/Chest: Breath sounds normal. No stridor. Tachypnea noted. He has no wheezes. He has no rhonchi. He has no rales. He exhibits no tenderness.   SOB appreciated. Appears uncomfortable secondary to SOB.  Tachypneic.   Abdominal: Soft. He exhibits no distension. There is no tenderness. There is no rebound and no guarding.   Musculoskeletal: Normal range of motion. He exhibits edema (trace edema  to feet and ankles). He exhibits no tenderness.   Neurological: He is alert and oriented to person, place, and time. He has normal strength. No cranial nerve deficit. GCS score is 15. GCS eye subscore is 4. GCS verbal subscore is 5. GCS motor subscore is 6.   Skin: Skin is warm and dry. No rash noted.   Psychiatric: He has a normal mood and affect. His behavior is normal. Judgment and thought content normal.         ED Course   Critical Care  Date/Time: 8/21/2019 10:32 PM  Performed by: Pedro Harris MD  Authorized by: Pedro Harris MD   Direct patient critical care time: 10 minutes  Additional history critical care time: 10 minutes  Ordering / reviewing critical care time: 10 minutes  Documentation critical care time: 10 minutes  Total critical care time (exclusive of procedural time) : 40 minutes  Critical care was time spent personally by me on the following activities: review of old charts, pulse oximetry, ordering and review of laboratory studies, ordering and review of radiographic studies, re-evaluation of patient's condition, examination of patient, ordering and performing treatments and interventions and evaluation of patient's response to treatment.        Labs Reviewed   CBC W/ AUTO DIFFERENTIAL - Abnormal; Notable for the following components:       Result Value    RDW 17.3 (*)     All other components within normal limits   COMPREHENSIVE METABOLIC PANEL - Abnormal; Notable for the following components:    BUN, Bld 23 (*)     Creatinine 1.8 (*)     eGFR if  48 (*)     eGFR if non  42 (*)     All other components within normal limits   TROPONIN I - Abnormal; Notable for the following components:    Troponin I 0.060 (*)     All other components within normal limits   B-TYPE NATRIURETIC PEPTIDE - Abnormal; Notable for the following components:    BNP 3,214 (*)     All other components within normal limits   MAGNESIUM     EKG Readings: (Independently Interpreted)    Initial Reading: No STEMI. Rhythm: Normal Sinus Rhythm. Heart Rate: 90. Ectopy: No Ectopy. ST Segments: Non-Specific ST Segment Depression. T Waves Flipped: V5 and V6. Axis: Normal. Other Findings: Prolonged QT Interval.   LAFB. No acute ischemic changes.       Imaging Results          X-Ray Chest AP Portable (In process)               X-Rays:   Independently Interpreted Readings:   Other Readings:  Findings consistent with moderate congestive heart failure.  No infiltrates.  Mild cardiomegaly.  No pleural effusion.    Medical Decision Making:   Differential Diagnosis:   CHF, acute coronary syndrome.  Clinical Tests:   Lab Tests: Ordered and Reviewed  Radiological Study: Ordered and Reviewed  Medical Tests: Ordered and Reviewed  ED Management:  2230:  Chest pain resolved.  Symptoms improving.  Will admit for observation for further diuresis for CHF exacerbation.  Will trend troponin.  No evidence acute ischemia on EKG.  Renal function is at baseline.  CBC at baseline.            Scribe Attestation:   Scribe #1: I performed the above scribed service and the documentation accurately describes the services I performed. I attest to the accuracy of the note.               Clinical Impression:       ICD-10-CM ICD-9-CM   1. Acute combined systolic and diastolic congestive heart failure I50.41 428.41     428.0   2. Chest pain R07.9 786.50   3. Shortness of breath R06.02 786.05         Disposition:   Disposition: Admitted  Condition: Stable                   Scribe attestation: I, Pedro Harris MD, personally performed the services described in this documentation. All medical record entries made by the scribe were at my direction and in my presence.  I have reviewed the chart and agree that the record reflects my personal performance and is accurate and complete.       Pedro Harris MD  08/21/19 9007

## 2019-08-22 NOTE — NURSING
Report received from CARLOS Rodriguez. Patient resting comfortably, no complaints, no acute distress noted. Plan of care reviewed with patient. Instructed patient to call for assistance before ambulating, side rails up x3, bed alarm set, call light in reach, non skid socks in use. Patient verbalized understanding of instructions.

## 2019-08-22 NOTE — PLAN OF CARE
"   08/22/19 1217   Final Note   Assessment Type Final Discharge Note   Anticipated Discharge Disposition Home   Hospital Follow Up  Appt(s) scheduled? Yes   Discharge plans and expectations educations in teach back method with documentation complete? Yes   Right Care Referral Info   Post Acute Recommendation No Care   pts nurse Francisca notified that pt can d/c from CM standpoint.    EDUCATION:   provided with educational information on CHF.  Information reviewed and placed in :My Healthcare Packet" to be brought home to use as resource after discharge.  Information included:  signs and symptoms to look for and call the doctor if experiencing, and symptoms that may indicate a medical emergency: CALL 911.      All questions answered.  Teach back method used.    Patient stated, " pt to take medications as prescribed and go to already scheduled follow up  ".        "

## 2019-08-22 NOTE — NURSING
In preparation for discharge, d/c'ed patient's tele monitor,  NSR prior to removal, d/c'ed patient's saline lock, applied pressure to site, secured site with tape and gauze. Discharge instructions given to patient . Patient verbalized understanding of instructions. Patient states willingness to comply.

## 2019-08-22 NOTE — PROGRESS NOTES
WRITTEN HEALTHCARE DISCHARGE INFORMATION      Things that YOU are RESPONSIBLE for to Manage Your Care At Home:     1. Getting your prescriptions filled.  2. Taking you medications as directed. DO NOT MISS ANY DOSES!  3. Going to your follow-up doctor appointments. This is important because it allows the doctor to monitor your progress and to determine if any changes need to be made to your treatment plan.     If you are unable to make your follow up appointments, please call the number listed and reschedule this appointment.      ____________HELP AT HOME____________________     Experiencing any SIGNS or SYMPTOMS: YOU CAN     Schedule a same day appopintment with your Primary Care Doctor or  you can call [facility] On [facility] Nurse Care Line for 24/7 assistance at [phone]     If you are experience any signs or symptoms that have become severe, Call 911 and come to your nearest Emergency Room.     Thank you for choosing [facility] and allowing us to care for you.   From your care management team:      You should receive a call from [facility] within 48-72 hours to help manage your care after discharge. Please try to make sure that you answer your phone for this important phone call.    Follow-up Information     [facility] On 8/28/2019.    Why:  8/28/19 1 pm   Contact information:  [facility]  [address]   [place]  [phone]

## 2019-08-22 NOTE — PLAN OF CARE
Problem: Fall Injury Risk  Goal: Absence of Fall and Fall-Related Injury  Outcome: Ongoing (interventions implemented as appropriate)  Intervention: Identify and Manage Contributors to Fall Injury Risk     08/22/19 1228   Manage Acute Allergic Reaction   Medication Review/Management medications reviewed   Identify and Manage Contributors to Fall Injury Risk   Self-Care Promotion independence encouraged;BADL personal routines maintained     Intervention: Promote Injury-Free Environment     08/22/19 0715 08/22/19 1227   Optimize Balance and Safe Activity   Safety Promotion/Fall Prevention  --  assistive device/personal item within reach;bed alarm set;commode/urinal/bedpan at bedside;Fall Risk reviewed with patient/family;lighting adjusted;medications reviewed;nonskid shoes/socks when out of bed;room near unit station;side rails raised x 3;instructed to call staff for mobility   Optimize [place] and Functional Mobility   Environmental Safety Modification assistive device/personal items within reach;clutter free environment maintained;lighting adjusted;room near unit station;room organization consistent  --

## 2019-09-26 ENCOUNTER — HOSPITAL ENCOUNTER (EMERGENCY)
Facility: HOSPITAL | Age: 54
Discharge: HOME | End: 2019-09-26
Attending: EMERGENCY MEDICINE
Payer: MEDICARE

## 2019-09-26 VITALS
RESPIRATION RATE: 34 BRPM | DIASTOLIC BLOOD PRESSURE: 110 MMHG | OXYGEN SATURATION: 99 % | SYSTOLIC BLOOD PRESSURE: 146 MMHG | TEMPERATURE: 98 F | BODY MASS INDEX: 23.8 KG/M2 | HEART RATE: 74 BPM | HEIGHT: 71 IN | WEIGHT: 170 LBS

## 2019-09-26 DIAGNOSIS — R07.9 CHEST PAIN: ICD-10-CM

## 2019-09-26 DIAGNOSIS — F14.10 COCAINE ABUSE: Primary | ICD-10-CM

## 2019-09-26 LAB
ALBUMIN SERPL BCP-MCNC: 3.4 G/DL (ref 3.5–5.2)
ALP SERPL-CCNC: 56 U/L (ref 55–135)
ALT SERPL W/O P-5'-P-CCNC: 23 U/L (ref 10–44)
AMPHET+METHAMPHET UR QL: NEGATIVE
ANION GAP SERPL CALC-SCNC: 8 MMOL/L (ref 8–16)
AST SERPL-CCNC: 23 U/L (ref 10–40)
BARBITURATES UR QL SCN>200 NG/ML: NEGATIVE
BASOPHILS # BLD AUTO: 0.02 K/UL (ref 0–0.2)
BASOPHILS NFR BLD: 0.4 % (ref 0–1.9)
BENZODIAZ UR QL SCN>200 NG/ML: NEGATIVE
BILIRUB SERPL-MCNC: 1.1 MG/DL (ref 0.1–1)
BNP SERPL-MCNC: 3874 PG/ML (ref 0–99)
BUN SERPL-MCNC: 18 MG/DL (ref 6–20)
BZE UR QL SCN: ABNORMAL
CALCIUM SERPL-MCNC: 9.2 MG/DL (ref 8.7–10.5)
CANNABINOIDS UR QL SCN: NEGATIVE
CHLORIDE SERPL-SCNC: 107 MMOL/L (ref 95–110)
CO2 SERPL-SCNC: 25 MMOL/L (ref 23–29)
CREAT SERPL-MCNC: 1.7 MG/DL (ref 0.5–1.4)
CREAT UR-MCNC: >450 MG/DL (ref 23–375)
DIFFERENTIAL METHOD: ABNORMAL
EOSINOPHIL # BLD AUTO: 0.1 K/UL (ref 0–0.5)
EOSINOPHIL NFR BLD: 1.1 % (ref 0–8)
ERYTHROCYTE [DISTWIDTH] IN BLOOD BY AUTOMATED COUNT: 15.8 % (ref 11.5–14.5)
EST. GFR  (AFRICAN AMERICAN): 52 ML/MIN/1.73 M^2
EST. GFR  (NON AFRICAN AMERICAN): 45 ML/MIN/1.73 M^2
GLUCOSE SERPL-MCNC: 102 MG/DL (ref 70–110)
HCT VFR BLD AUTO: 42.7 % (ref 40–54)
HGB BLD-MCNC: 13.4 G/DL (ref 14–18)
LYMPHOCYTES # BLD AUTO: 1.2 K/UL (ref 1–4.8)
LYMPHOCYTES NFR BLD: 21.7 % (ref 18–48)
MCH RBC QN AUTO: 27.9 PG (ref 27–31)
MCHC RBC AUTO-ENTMCNC: 31.4 G/DL (ref 32–36)
MCV RBC AUTO: 89 FL (ref 82–98)
METHADONE UR QL SCN>300 NG/ML: NEGATIVE
MONOCYTES # BLD AUTO: 0.6 K/UL (ref 0.3–1)
MONOCYTES NFR BLD: 10 % (ref 4–15)
NEUTROPHILS # BLD AUTO: 3.8 K/UL (ref 1.8–7.7)
NEUTROPHILS NFR BLD: 66.8 % (ref 38–73)
OPIATES UR QL SCN: ABNORMAL
PCP UR QL SCN>25 NG/ML: NEGATIVE
PLATELET # BLD AUTO: 198 K/UL (ref 150–350)
PMV BLD AUTO: 10.1 FL (ref 9.2–12.9)
POTASSIUM SERPL-SCNC: 4.5 MMOL/L (ref 3.5–5.1)
PROT SERPL-MCNC: 6.5 G/DL (ref 6–8.4)
RBC # BLD AUTO: 4.8 M/UL (ref 4.6–6.2)
SODIUM SERPL-SCNC: 140 MMOL/L (ref 136–145)
TOXICOLOGY INFORMATION: ABNORMAL
TROPONIN I SERPL DL<=0.01 NG/ML-MCNC: 0.06 NG/ML (ref 0–0.03)
TROPONIN I SERPL DL<=0.01 NG/ML-MCNC: 0.06 NG/ML (ref 0–0.03)
WBC # BLD AUTO: 5.71 K/UL (ref 3.9–12.7)

## 2019-09-26 PROCEDURE — 99285 EMERGENCY DEPT VISIT HI MDM: CPT | Mod: 25

## 2019-09-26 PROCEDURE — 25000003 PHARM REV CODE 250: Performed by: EMERGENCY MEDICINE

## 2019-09-26 PROCEDURE — 85025 COMPLETE CBC W/AUTO DIFF WBC: CPT

## 2019-09-26 PROCEDURE — 80053 COMPREHEN METABOLIC PANEL: CPT

## 2019-09-26 PROCEDURE — 93010 EKG 12-LEAD: ICD-10-PCS | Mod: ,,, | Performed by: INTERNAL MEDICINE

## 2019-09-26 PROCEDURE — 93010 ELECTROCARDIOGRAM REPORT: CPT | Mod: ,,, | Performed by: INTERNAL MEDICINE

## 2019-09-26 PROCEDURE — 84484 ASSAY OF TROPONIN QUANT: CPT | Mod: 91

## 2019-09-26 PROCEDURE — 63600175 PHARM REV CODE 636 W HCPCS: Performed by: EMERGENCY MEDICINE

## 2019-09-26 PROCEDURE — 80307 DRUG TEST PRSMV CHEM ANLYZR: CPT

## 2019-09-26 PROCEDURE — 93005 ELECTROCARDIOGRAM TRACING: CPT

## 2019-09-26 PROCEDURE — 83880 ASSAY OF NATRIURETIC PEPTIDE: CPT

## 2019-09-26 PROCEDURE — 96375 TX/PRO/DX INJ NEW DRUG ADDON: CPT

## 2019-09-26 PROCEDURE — 96374 THER/PROPH/DIAG INJ IV PUSH: CPT

## 2019-09-26 RX ORDER — FUROSEMIDE 10 MG/ML
60 INJECTION INTRAMUSCULAR; INTRAVENOUS
Status: COMPLETED | OUTPATIENT
Start: 2019-09-26 | End: 2019-09-26

## 2019-09-26 RX ORDER — MORPHINE SULFATE 10 MG/ML
4 INJECTION INTRAMUSCULAR; INTRAVENOUS; SUBCUTANEOUS
Status: COMPLETED | OUTPATIENT
Start: 2019-09-26 | End: 2019-09-26

## 2019-09-26 RX ADMIN — NITROGLYCERIN 2 INCH: 20 OINTMENT TOPICAL at 01:09

## 2019-09-26 RX ADMIN — MORPHINE SULFATE 4 MG: 10 INJECTION INTRAVENOUS at 01:09

## 2019-09-26 RX ADMIN — FUROSEMIDE 60 MG: 10 INJECTION, SOLUTION INTRAMUSCULAR; INTRAVENOUS at 04:09

## 2019-09-26 NOTE — ED PROVIDER NOTES
Encounter Date: 9/26/2019    SCRIBE #1 NOTE: I, Nohelia Quintana, am scribing for, and in the presence of,  Aguila Newell MD. I have scribed the following portions of the note - Other sections scribed: HPI, ROS.       History     Chief Complaint   Patient presents with    Chest Pain     midsternal chest tightness radiating to left arm x 2 hours, also reports SOB     CC: Chest Pain    HPI: This is a 54 y.o. M who has Cardiomyopathy, CHF, CKD, HLD, HTN, and Hx of Stroke who presents to the ED via EMS transportation for emergent evaluation of acute and moderate mid-sternal CP that radiates to the left side of chest that began upon waking this morning. He describes the CP as a burning sensation. Pt has associated SOB, diaphoresis, and abdominal pain. Pt states that he is compliant with daily medications, which he took this morning. He was administered medication en route without relief. Additionally, the pt states that he last used cocaine 2 days ago. Pt denies fever, cough, vomiting, weakness, or headaches.    The history is provided by the patient. No  was used.     Review of patient's allergies indicates:  No Known Allergies  Past Medical History:   Diagnosis Date    Cardiomyopathy     CHF (congestive heart failure)     CKD (chronic kidney disease), stage III     Cocaine abuse     Hyperlipidemia     Hypertension     Pneumonia     Proteinuria     Pulmonary hypertension     Renal disorder     Respiratory failure with hypoxia     Stroke      No past surgical history on file.  No family history on file.  Social History     Tobacco Use    Smoking status: Never Smoker    Smokeless tobacco: Never Used   Substance Use Topics    Alcohol use: Yes    Drug use: Yes     Types: Cocaine     Review of Systems   Constitutional: Positive for diaphoresis. Negative for chills and fever.   HENT: Negative for sore throat.    Respiratory: Positive for shortness of breath. Negative for cough.     Cardiovascular: Positive for chest pain.   Gastrointestinal: Positive for abdominal pain. Negative for diarrhea, nausea and vomiting.   Genitourinary: Negative for dysuria.   Musculoskeletal: Negative for back pain.   Skin: Negative for rash.   Neurological: Negative for weakness and headaches.   Hematological: Does not bruise/bleed easily.       Physical Exam     Initial Vitals [09/26/19 1248]   BP Pulse Resp Temp SpO2   (!) 153/111 68 (!) 24 98.1 °F (36.7 °C) 99 %      MAP       --         Physical Exam    Nursing note and vitals reviewed.  Constitutional: He appears well-developed and well-nourished. He is not diaphoretic. No distress.   HENT:   Head: Normocephalic and atraumatic.   Right Ear: External ear normal.   Left Ear: External ear normal.   Nose: Nose normal.   Mouth/Throat: Oropharynx is clear and moist.   Eyes: Conjunctivae are normal. Pupils are equal, round, and reactive to light. Right eye exhibits no discharge. Left eye exhibits no discharge. No scleral icterus.   Neck: Normal range of motion. Neck supple. No JVD present.   Cardiovascular: Normal rate, regular rhythm, normal heart sounds and intact distal pulses. Exam reveals no gallop and no friction rub.    No murmur heard.  Pulmonary/Chest: Breath sounds normal. No stridor. No respiratory distress. He has no wheezes. He has no rhonchi. He has no rales. He exhibits no tenderness.   Abdominal: Soft. Bowel sounds are normal. He exhibits no distension and no mass. There is no tenderness. There is no rebound and no guarding.   Musculoskeletal: Normal range of motion. He exhibits no edema or tenderness.   Neurological: He is alert and oriented to person, place, and time. He has normal strength. No cranial nerve deficit or sensory deficit.   Skin: Skin is warm and dry. No rash noted. No erythema. No pallor.   Psychiatric: He has a normal mood and affect. His behavior is normal. Judgment and thought content normal.         ED Course   Procedures  Labs  Reviewed   CBC W/ AUTO DIFFERENTIAL   COMPREHENSIVE METABOLIC PANEL   TROPONIN I   B-TYPE NATRIURETIC PEPTIDE   DRUG SCREEN PANEL, URINE EMERGENCY     EKG Readings: (Independently Interpreted)   Initial Reading: No STEMI. Ectopy: No Ectopy.   EKG reviewed and interpreted by me shows sinus rhythm at a rate of 78.  There is an incomplete left bundle-branch block.  The QTC is prolonged.  There is a left axis deviation.  There are T-wave inversions in the high lateral leads similar in morphology to EKG from August 21, 2019.         Imaging Results    None       X-Rays:   Independently Interpreted Readings:   Other Readings:  Chest x-ray reveals pulmonary edema.    Medical Decision Making:   ED Management:  This is the emergent evaluation of a 54-year-old male presents emergency department complaining of chest pain and shortness of breath that started this morning.  Differential diagnosis at the time of initial evaluation included, but was not limited to: Acute myocardial infarction, acute coronary syndrome, pericarditis, myocarditis, pneumonia, pneumothorax, gastroesophageal reflux disease, pleurisy, costochondritis.  I considered but doubt pulmonary embolus.  I reviewed this patient's chart.  History of congestive heart failure, stroke, and cocaine abuse.  He states his last cocaine use was 2 days ago.  He also smokes tobacco.   This patient likely has cocaine induced chest pain. He does not have evidence of acute ischemic pattern today that is new from prior.  He has a mildly elevated troponin but on recheck this was stable without significant increase.  He does have some pulmonary edema on chest x-ray but is not hypoxic and not in respiratory distress.  He states he has been out of his Lasix but is getting a tomorrow.  I gave 1 dose of IV Lasix in the emergency department.  I reviewed this patient's chart and noted that between [facility] and [facility], he has been admitted multiple times in  the last 2 months for chest pain related workups.  I do not believe he needs another chest pain rule out at this time.  I did advised him strongly to stop using cocaine and crack cocaine as this could exacerbate his chest pain and heart failure and could cause a myocardial infarction or possibly cardiac arrest.  Patient acknowledged this.  He was advised to take all his medications as prescribed and follow up with all appointments.  He was advised to return for new or worsening symptoms such as severe worsening of chest pain, shortness of breath.               I, Aguila Newell MD, personally performed the services described in this documentation. All medical record entries made by the scribe were at my direction and in my presence. I have reviewed the chart and agree that the record reflects my personal performance and is accurate and complete.        Clinical Impression:       ICD-10-CM ICD-9-CM   1. Chest pain R07.9 786.50                                Aguila Newell Jr., MD  09/26/19 [id number]

## 2019-09-26 NOTE — DISCHARGE INSTRUCTIONS
Please continue taking all medications as prescribed.  Return if you develop any new or worsening symptoms such as severe chest pain or shortness of breath. Please stop using cocaine and crack cocaine as this will make your chest pain worse and could cause an acute heart attack or even a cardiac arrest (death).  Follow up with all schedule appointments.

## 2019-09-26 NOTE — ED TRIAGE NOTES
Pt to ER with c/o left sided chest pain and mid abdominal pain that started 1 hr PTA. Pt reports dizziness with associated diaphoreses. Pt admits to crack cocaine use 2 days ago. Pt with hx of CHF but denies MI or stent placements. VSS